# Patient Record
Sex: FEMALE | Race: WHITE | Employment: FULL TIME | URBAN - METROPOLITAN AREA
[De-identification: names, ages, dates, MRNs, and addresses within clinical notes are randomized per-mention and may not be internally consistent; named-entity substitution may affect disease eponyms.]

---
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
## 2023-01-19 ENCOUNTER — HOSPITAL ENCOUNTER (EMERGENCY)
Age: 18
Discharge: HOME | End: 2023-01-19
Attending: EMERGENCY MEDICINE
Payer: MEDICAID

## 2023-01-19 VITALS
DIASTOLIC BLOOD PRESSURE: 72 MMHG | RESPIRATION RATE: 16 BRPM | TEMPERATURE: 99.6 F | HEART RATE: 92 BPM | WEIGHT: 163.36 LBS | BODY MASS INDEX: 24.84 KG/M2 | SYSTOLIC BLOOD PRESSURE: 115 MMHG | OXYGEN SATURATION: 99 %

## 2023-01-19 DIAGNOSIS — J02.9 PHARYNGITIS, UNSPECIFIED ETIOLOGY: Primary | ICD-10-CM

## 2023-01-19 LAB — S PYO AG THROAT QL: NEGATIVE

## 2023-01-19 PROCEDURE — 87070 CULTURE OTHR SPECIMN AEROBIC: CPT

## 2023-01-19 PROCEDURE — 99283 EMERGENCY DEPT VISIT LOW MDM: CPT

## 2023-01-19 PROCEDURE — 74011250637 HC RX REV CODE- 250/637: Performed by: EMERGENCY MEDICINE

## 2023-01-19 PROCEDURE — 87880 STREP A ASSAY W/OPTIC: CPT

## 2023-01-19 RX ORDER — IBUPROFEN 600 MG/1
600 TABLET ORAL
Status: COMPLETED | OUTPATIENT
Start: 2023-01-19 | End: 2023-01-19

## 2023-01-19 RX ORDER — IBUPROFEN 600 MG/1
600 TABLET ORAL
Qty: 20 TABLET | Refills: 0 | Status: SHIPPED | OUTPATIENT
Start: 2023-01-19

## 2023-01-19 RX ADMIN — IBUPROFEN 600 MG: 600 TABLET, FILM COATED ORAL at 08:14

## 2023-01-19 NOTE — Clinical Note
[address]  [address] [facility] DEPT  [address] [facility] [address]  [phone]    Work/School Note    Date: 1/19/2023    To Whom It May concern:    Ayse Gamboa was seen and treated today in the emergency room by the following provider(s):  Attending Provider: Silva Gonzalez MD.      Ayse Gamboa is excused from work/school on 01/19/23 and 01/20/23. She is medically clear to return to work/school on 1/21/2023.        Sincerely,          Brock Cervantes MD

## 2023-01-19 NOTE — ED TRIAGE NOTES
Triage Note: Pt with sore throat that began last night. Pt also reports body aches and chills, but unsure if she has fevers. No medications taken PTA.

## 2023-01-19 NOTE — ED PROVIDER NOTES
Patient is a 20-year-old who presents with 2 days of a runny nose and sore throat. Patient states she feels like her head is a little bit stopped up. No cough. Patient has a birth control implant but does not take any daily medications by mouth. No cough. No vomiting or diarrhea. No fever. 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 Patient states she has had tonsillitis in the past and that is her concern. Patient does not feel like she has symptoms consistent with COVID at this time. Patient tolerating p.o. well. History reviewed. No pertinent past medical history. Past Surgical History:   Procedure Laterality Date    HX WISDOM TEETH EXTRACTION           History reviewed. No pertinent family history. Social History     Socioeconomic History    Marital status: SINGLE     Spouse name: Not on file    Number of children: Not on file    Years of education: Not on file    Highest education level: Not on file   Occupational History    Not on file   Tobacco Use    Smoking status: Never     Passive exposure: Never    Smokeless tobacco: Never   Substance and Sexual Activity    Alcohol use: Yes     Comment: occasionally    Drug use: Not Currently     Types: Marijuana    Sexual activity: Not on file   Other Topics Concern    Not on file   Social History Narrative    Not on file     Social Determinants of Health     Financial Resource Strain: Low Risk     Difficulty of Paying Living Expenses: Not hard at all   Food Insecurity: No Food Insecurity    Worried About Running Out of Food in the Last Year: Never true    Ran Out of Food in the Last Year: Never true   Transportation Needs: Not on file   Physical Activity: Not on file   Stress: Not on file   Social Connections: Not on file   Intimate Partner Violence: Not on file   Housing Stability: Not on file         ALLERGIES: Patient has no known allergies. Review of Systems   Constitutional:  Negative for fever.    HENT:  Positive for rhinorrhea and sore throat. Negative for congestion and ear pain. Eyes:  Negative for discharge. Respiratory:  Negative for cough and shortness of breath. Cardiovascular:  Negative for chest pain. Gastrointestinal:  Negative for abdominal pain, constipation, diarrhea, nausea and vomiting. Genitourinary:  Negative for dysuria. Musculoskeletal:  Negative for arthralgias and myalgias. Skin:  Negative for rash. Neurological:  Negative for weakness. Vitals:    01/19/23 0803   BP: 115/72   Pulse: (!) 105   Resp: 16   Temp: 99.6 °F (37.6 °C)   SpO2: 99%   Weight: 74.1 kg (163 lb 5.8 oz)            Physical Exam  Vitals and nursing note reviewed. Constitutional:       General: She is not in acute distress. Appearance: She is well-developed. She is not ill-appearing. HENT:      Head: Normocephalic and atraumatic. Right Ear: Tympanic membrane, ear canal and external ear normal.      Left Ear: Tympanic membrane, ear canal and external ear normal.      Nose: Nose normal. No congestion or rhinorrhea. Mouth/Throat:      Mouth: Mucous membranes are moist.      Pharynx: Posterior oropharyngeal erythema present. No oropharyngeal exudate. Eyes:      General:         Right eye: No discharge. Left eye: No discharge. Conjunctiva/sclera: Conjunctivae normal.   Cardiovascular:      Rate and Rhythm: Normal rate and regular rhythm. Pulmonary:      Effort: Pulmonary effort is normal. No respiratory distress. Breath sounds: Normal breath sounds. Abdominal:      General: There is no distension. Palpations: Abdomen is soft. Tenderness: There is no abdominal tenderness. There is no guarding or rebound. Musculoskeletal:         General: Normal range of motion. Cervical back: Normal range of motion and neck supple. Lymphadenopathy:      Cervical: No cervical adenopathy. Skin:     General: Skin is warm and dry. Findings: No rash.    Neurological:      General: No focal deficit present. Mental Status: She is alert and oriented to person, place, and time. Mental status is at baseline. Motor: No weakness. Psychiatric:         Mood and Affect: Mood normal.         Behavior: Behavior normal.        Medical Decision Making  21year-old with sore throat and rhinorrhea for the past 2 days. No fever or nausea or vomiting or diarrhea. On exam patient is in no respiratory distress and is not having any cough. Pharynx has some erythema but no exudate. Strep pharyngitis is in the differential and will obtain strep testing. Offered COVID testing but patient deferred at this time. No respiratory distress that would suggest pneumonia necessitating x-ray. Patient is tolerating p.o. well with no evidence to suggest dehydration that would necessitate IV fluids. Likely viral illness in nature. No evidence of an abscess on exam.    Amount and/or Complexity of Data Reviewed  Labs: ordered. Risk  Prescription drug management. Procedures      Recent Results (from the past 24 hour(s))   POC GROUP A STREP    Collection Time: 01/19/23  8:56 AM   Result Value Ref Range    Group A strep (POC) Negative NEG         No results found. 9:35 AM  Child has been re-examined and appears well. Child is active, interactive and appears well hydrated. Laboratory tests, medications, x-rays, diagnosis, follow up plan and return instructions have been reviewed and discussed with the family. Family has had the opportunity to ask questions about their child's care. Family expresses understanding and agreement with care plan, follow up and return instructions. Family agrees to return the child to the ER in 48 hours if their symptoms are not improving or immediately if they have any change in their condition. Family understands to follow up with their pediatrician as instructed to ensure resolution of the issue seen for today.   Please note that this dictation was completed with Dragon, computer voice recognition software. Quite often unanticipated grammatical, syntax, homophones, and other interpretive errors are inadvertently transcribed by the computer software. Please disregard these errors. Additionally, please excuse any errors that have escaped final proofreading.

## 2023-01-19 NOTE — ED NOTES
Pt discharged home with parent/guardian. Pt acting age appropriately, respirations regular and unlabored, cap refill less than two seconds. Skin pink, dry and warm. Lungs clear bilaterally. No further complaints at this time. Parent/guardian verbalized understanding of discharge paperwork and has no further questions at this time. Education provided about continuation of care, follow up care and medication administration of ibuprofen. Parent/guardian able to provided teach back about discharge instructions.

## 2023-01-21 LAB
BACTERIA SPEC CULT: NORMAL
SERVICE CMNT-IMP: NORMAL

## 2023-04-11 ENCOUNTER — TELEPHONE (OUTPATIENT)
Dept: INTERNAL MEDICINE CLINIC | Age: 18
End: 2023-04-11

## 2023-07-03 ENCOUNTER — HOSPITAL ENCOUNTER (EMERGENCY)
Facility: HOSPITAL | Age: 18
Discharge: HOME | End: 2023-07-03
Attending: PEDIATRICS
Payer: MEDICAID

## 2023-07-03 VITALS
RESPIRATION RATE: 20 BRPM | SYSTOLIC BLOOD PRESSURE: 112 MMHG | WEIGHT: 173.5 LBS | TEMPERATURE: 98.7 F | OXYGEN SATURATION: 99 % | HEART RATE: 82 BPM | DIASTOLIC BLOOD PRESSURE: 76 MMHG | BODY MASS INDEX: 26.38 KG/M2
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
## 2023-07-03 DIAGNOSIS — L03.116 CELLULITIS OF LEFT LOWER EXTREMITY: ICD-10-CM

## 2023-07-03 DIAGNOSIS — R60.0 LOCALIZED EDEMA: ICD-10-CM

## 2023-07-03 PROCEDURE — 6370000000 HC RX 637 (ALT 250 FOR IP): Performed by: PEDIATRICS

## 2023-07-03 PROCEDURE — 99283 EMERGENCY DEPT VISIT LOW MDM: CPT

## 2023-07-03 RX ORDER — DIPHENHYDRAMINE HCL 25 MG
25 CAPSULE ORAL ONCE
Status: COMPLETED | OUTPATIENT
Start: 2023-07-03 | End: 2023-07-03

## 2023-07-03 RX ORDER — IBUPROFEN 600 MG/1
600 TABLET ORAL EVERY 6 HOURS PRN
Qty: 20 TABLET | Refills: 0 | Status: SHIPPED | OUTPATIENT
Start: 2023-07-03

## 2023-07-03 RX ORDER — SULFAMETHOXAZOLE AND TRIMETHOPRIM 800; 160 MG/1; MG/1
1 TABLET ORAL 2 TIMES DAILY
Qty: 18 TABLET | Refills: 0 | Status: SHIPPED | OUTPATIENT
Start: 2023-07-03 | End: 2023-07-12

## 2023-07-03 RX ORDER — DIPHENHYDRAMINE HCL 25 MG
25 CAPSULE ORAL EVERY 6 HOURS PRN
Qty: 20 CAPSULE | Refills: 0 | Status: SHIPPED | OUTPATIENT
Start: 2023-07-03

## 2023-07-03 RX ORDER — SULFAMETHOXAZOLE AND TRIMETHOPRIM 800; 160 MG/1; MG/1
1 TABLET ORAL ONCE
Status: COMPLETED | OUTPATIENT
Start: 2023-07-03 | End: 2023-07-03

## 2023-07-03 RX ORDER — PREDNISONE 20 MG/1
40 TABLET ORAL ONCE
Status: COMPLETED | OUTPATIENT
Start: 2023-07-03 | End: 2023-07-03

## 2023-07-03 RX ORDER — PREDNISONE 50 MG/1
50 TABLET ORAL DAILY
Qty: 4 TABLET | Refills: 0 | Status: SHIPPED | OUTPATIENT
Start: 2023-07-03 | End: 2023-07-07

## 2023-07-03 RX ORDER — IBUPROFEN 600 MG/1
600 TABLET ORAL ONCE
Status: COMPLETED | OUTPATIENT
Start: 2023-07-03 | End: 2023-07-03

## 2023-07-03 RX ADMIN — IBUPROFEN 600 MG: 600 TABLET, FILM COATED ORAL at 05:09

## 2023-07-03 RX ADMIN — SULFAMETHOXAZOLE AND TRIMETHOPRIM 1 TABLET: 800; 160 TABLET ORAL at 05:09

## 2023-07-03 RX ADMIN — PREDNISONE 40 MG: 20 TABLET ORAL at 05:09

## 2023-07-03 RX ADMIN — DIPHENHYDRAMINE HYDROCHLORIDE 25 MG: 25 CAPSULE ORAL at 05:09

## 2023-07-03 ASSESSMENT — PAIN SCALES - GENERAL: PAINLEVEL_OUTOF10: 0

## 2023-07-03 NOTE — ED PROVIDER NOTES
MG TABLET    Take 1 tablet by mouth daily for 4 days    SULFAMETHOXAZOLE-TRIMETHOPRIM (BACTRIM DS) 800-160 MG PER TABLET    Take 1 tablet by mouth 2 times daily for 9 days         (Please note that portions of this note were completed with a voice recognition program.  Efforts were made to edit the dictations but occasionally words are mis-transcribed.)    Julian Olmstead MD (electronically signed)  Emergency Attending Physician / Physician Assistant / Nurse Practitioner              Eufemia Avila MD  07/03/23 [id number]

## 2023-07-03 NOTE — ED TRIAGE NOTES
Triage Note: Pt. States on Saturday she was outside and got bit by an insect. Pt. C/o pain and itching to bite on left ankle and two bites to back of right upper leg. No Meds PTA.

## 2023-07-03 NOTE — ED NOTES
Pt discharged home with parent/guardian. Pt acting age appropriately, respirations regular and unlabored, cap refill less than two seconds. Skin pink, dry and warm. Lungs clear bilaterally. No further complaints at this time. Parent/guardian verbalized understanding of discharge paperwork and has no further questions at this time. Education provided about continuation of care, follow up care with PCP, return for worsening symptoms and medication administration: prescription instructions provided for Motrin, Tylenol, Benadryl, and Bactrim. Parent/guardian able to provided teach back about discharge instructions.        Aakash Sierra RN  07/03/23 [id number]

## 2023-09-18 ENCOUNTER — OFFICE VISIT (OUTPATIENT)
Facility: CLINIC | Age: 18
End: 2023-09-18
Payer: MEDICAID

## 2023-09-18 VITALS
TEMPERATURE: 98.8 F | WEIGHT: 176.2 LBS | DIASTOLIC BLOOD PRESSURE: 64 MMHG | OXYGEN SATURATION: 98 % | HEIGHT: 68 IN | SYSTOLIC BLOOD PRESSURE: 102 MMHG | BODY MASS INDEX: 26.7 KG/M2 | HEART RATE: 77 BPM

## 2023-09-18 DIAGNOSIS — Z00.00 ENCOUNTER FOR WELL ADULT EXAM WITHOUT ABNORMAL FINDINGS: Primary | ICD-10-CM

## 2023-09-18 DIAGNOSIS — K59.00 CONSTIPATION, UNSPECIFIED CONSTIPATION TYPE: ICD-10-CM

## 2023-09-18 PROCEDURE — 99395 PREV VISIT EST AGE 18-39: CPT | Performed by: INTERNAL MEDICINE

## 2023-09-18 RX ORDER — SENNA AND DOCUSATE SODIUM 50; 8.6 MG/1; MG/1
2 TABLET, FILM COATED ORAL DAILY
Qty: 180 TABLET | Refills: 1 | Status: SHIPPED | OUTPATIENT
Start: 2023-09-18 | End: 2024-03-16

## 2023-09-18 ASSESSMENT — ENCOUNTER SYMPTOMS
CONSTIPATION: 1
RESPIRATORY NEGATIVE: 1

## 2023-09-18 ASSESSMENT — PATIENT HEALTH QUESTIONNAIRE - PHQ9
1. LITTLE INTEREST OR PLEASURE IN DOING THINGS: 0
SUM OF ALL RESPONSES TO PHQ QUESTIONS 1-9: 0

## 2023-10-26 ENCOUNTER — TELEPHONE (OUTPATIENT)
Facility: CLINIC | Age: 18
End: 2023-10-26

## 2023-10-26 NOTE — TELEPHONE ENCOUNTER
----- Message from Grayson Arreola sent at 10/25/2023  2:55 PM EDT -----  Subject: Referral Request    Reason for referral request? Lorraine Garcia for pregnancy   Provider patient wants to be referred to(if known):     Provider Phone Number(if known): Additional Information for Provider? patient would like to have a blood   test done for pregnancy.  Please call to discuss   ---------------------------------------------------------------------------  --------------  Vamsi [place]    [phone]; OK to leave message on voicemail  ---------------------------------------------------------------------------  --------------

## 2023-11-02 NOTE — TELEPHONE ENCOUNTER
Returned pt call. N/A. Left message. Pt says ok to leave message with instructions. Left pt message to please call her GYN office. That Dr. Eli Chinchilla was under impression she was going to her GYN to discuss conception planning. Please call GYN regarding pregnancy blood work.  Thank you

## 2024-01-08 ENCOUNTER — TELEPHONE (OUTPATIENT)
Facility: CLINIC | Age: 19
End: 2024-01-08

## 2024-01-08 NOTE — TELEPHONE ENCOUNTER
Spoke to patient.   There is no immunizations in her chart and there is nothing listed in the [place] Immunization Information System.  I tried multiple times to look her up and system is stating patient not found.

## 2024-01-08 NOTE — TELEPHONE ENCOUNTER
----- Message from Etta Baca sent at 1/8/2024  9:50 AM EST -----  Subject: Message to Provider    QUESTIONS  Information for Provider? Dorene needs a list of Vaccinations that she has   got done. she needs it printed out this week for a job. please call dorene   if needed.   ---------------------------------------------------------------------------  --------------  CALL BACK INFO  [phone]; OK to leave message on voicemail  ---------------------------------------------------------------------------  --------------  SCRIPT ANSWERS  Relationship to Patient? Self

## 2024-02-05 ENCOUNTER — TELEPHONE (OUTPATIENT)
Facility: CLINIC | Age: 19
End: 2024-02-05

## 2024-02-05 NOTE — TELEPHONE ENCOUNTER
----- Message from Solange Romo sent at 2/5/2024  3:16 PM EST -----  Subject: Message to Provider    QUESTIONS  Information for Provider? Patient is reaching out wanting to be put on a   wait list incase a sooner appointment comes up with Christina Talavera for   an establish care/physical appointment. Patient is currently schedule for   5.29.2024 and was a former patient of Katia Vasquez.   Please reach out to the patient to discuss.   ---------------------------------------------------------------------------  --------------  CALL BACK INFO  [phone]; OK to leave message on voicemail  ---------------------------------------------------------------------------  --------------  SCRIPT ANSWERS  Relationship to Patient? Self

## 2024-02-08 ENCOUNTER — TELEPHONE (OUTPATIENT)
Facility: CLINIC | Age: 19
End: 2024-02-08

## 2024-02-08 NOTE — TELEPHONE ENCOUNTER
----- Message from Natalieesperanza Castañeda sent at 2/2/2024 12:30 PM EST -----  Subject: Appointment Request    Reason for Call: New Patient/New to Provider Appointment needed: New   Patient Request Appointment    QUESTIONS    Reason for appointment request? No appointments available during search     Additional Information for Provider? Dorene Quick received a call from the   [facility] to reschedule with CARMELITA Hernandez nothing available during   search please call to reschedule appointment for a new patient.  ---------------------------------------------------------------------------  --------------  CALL BACK INFO  [phone]; OK to leave message on voicemail  ---------------------------------------------------------------------------  --------------  SCRIPT ANSWERS

## 2024-11-22 ENCOUNTER — OFFICE VISIT (OUTPATIENT)
Facility: CLINIC | Age: 19
End: 2024-11-22
Payer: MEDICAID

## 2024-11-22 VITALS
RESPIRATION RATE: 16 BRPM | HEART RATE: 72 BPM | BODY MASS INDEX: 25.61 KG/M2 | DIASTOLIC BLOOD PRESSURE: 67 MMHG | WEIGHT: 169 LBS | HEIGHT: 68 IN | SYSTOLIC BLOOD PRESSURE: 121 MMHG | TEMPERATURE: 98.9 F | OXYGEN SATURATION: 99 %

## 2024-11-22 DIAGNOSIS — Z11.3 ROUTINE SCREENING FOR STI (SEXUALLY TRANSMITTED INFECTION): ICD-10-CM

## 2024-11-22 DIAGNOSIS — Z11.4 SCREENING FOR HIV WITHOUT PRESENCE OF RISK FACTORS: ICD-10-CM

## 2024-11-22 DIAGNOSIS — Z3A.08 8 WEEKS GESTATION OF PREGNANCY: ICD-10-CM

## 2024-11-22 DIAGNOSIS — N91.2 AMENORRHEA: Primary | ICD-10-CM

## 2024-11-22 DIAGNOSIS — R89.9 ABNORMAL LABORATORY TEST: ICD-10-CM

## 2024-11-22 DIAGNOSIS — N91.2 AMENORRHEA: ICD-10-CM

## 2024-11-22 LAB
HCG QUALITATIVE, POC: POSITIVE
HCG, PREGNANCY, URINE, POC: POSITIVE
VALID INTERNAL CONTROL, POC: YES

## 2024-11-22 PROCEDURE — 99203 OFFICE O/P NEW LOW 30 MIN: CPT | Performed by: NURSE PRACTITIONER

## 2024-11-22 PROCEDURE — 84703 CHORIONIC GONADOTROPIN ASSAY: CPT | Performed by: NURSE PRACTITIONER

## 2024-11-22 SDOH — ECONOMIC STABILITY: FOOD INSECURITY: WITHIN THE PAST 12 MONTHS, THE FOOD YOU BOUGHT JUST DIDN'T LAST AND YOU DIDN'T HAVE MONEY TO GET MORE.: NEVER TRUE

## 2024-11-22 SDOH — ECONOMIC STABILITY: INCOME INSECURITY: HOW HARD IS IT FOR YOU TO PAY FOR THE VERY BASICS LIKE FOOD, HOUSING, MEDICAL CARE, AND HEATING?: NOT HARD AT ALL

## 2024-11-22 SDOH — ECONOMIC STABILITY: FOOD INSECURITY: WITHIN THE PAST 12 MONTHS, YOU WORRIED THAT YOUR FOOD WOULD RUN OUT BEFORE YOU GOT MONEY TO BUY MORE.: NEVER TRUE

## 2024-11-22 ASSESSMENT — PATIENT HEALTH QUESTIONNAIRE - PHQ9
SUM OF ALL RESPONSES TO PHQ QUESTIONS 1-9: 0
SUM OF ALL RESPONSES TO PHQ QUESTIONS 1-9: 0
1. LITTLE INTEREST OR PLEASURE IN DOING THINGS: NOT AT ALL
2. FEELING DOWN, DEPRESSED OR HOPELESS: NOT AT ALL
SUM OF ALL RESPONSES TO PHQ9 QUESTIONS 1 & 2: 0
SUM OF ALL RESPONSES TO PHQ QUESTIONS 1-9: 0
SUM OF ALL RESPONSES TO PHQ QUESTIONS 1-9: 0

## 2024-11-22 NOTE — PROGRESS NOTES
Chief Complaint   Patient presents with    Establish Care    Annual Exam    Amenorrhea         /67 (Site: Left Upper Arm, Position: Sitting)   Pulse 72   Temp 98.9 °F (37.2 °C) (Oral)   Resp 16   Ht 1.727 m (5' 8\")   Wt 76.7 kg (169 lb)   SpO2 99%   BMI 25.70 kg/m²       \"Have you been to the ER, urgent care clinic since your last visit?  Hospitalized since your last visit?\"    NO    “Have you seen or consulted any other health care providers outside our system since your last visit?”    NO     “Have you had a pap smear?”    YES - Where: [facility] ([facility]) 02/2024 (Avani Carrizales) Nurse/CMA to request most recent records if not in the chart    No cervical cancer screening on file

## 2024-11-22 NOTE — PROGRESS NOTES
Subjective  Chief Complaint   Patient presents with    Establish Care    Annual Exam    Amenorrhea     HPI:  Dorene Quick is a 19 y.o. female presents with limited past medical history.  Patient presents with suspicion of possible pregnancy.  Urine pregnancy test positive, patient reports removal of controlling in July 2024.  Referring patient to OB to establish care and relationship.  Patient currently denies nausea vomiting, headaches, dizziness, syncopal episodes, or chest pain      Review of Systems   Constitutional:  Negative for chills, fatigue and fever.   HENT:  Negative for ear pain, hearing loss, nosebleeds, rhinorrhea, sore throat and trouble swallowing.    Eyes:  Negative for pain, discharge and redness.   Respiratory:  Negative for cough, chest tightness, shortness of breath and wheezing.    Gastrointestinal:  Negative for abdominal pain, nausea and vomiting.   Endocrine: Negative for cold intolerance and heat intolerance.   Genitourinary:  Negative for dysuria, flank pain, genital sores, menstrual problem, urgency, vaginal bleeding, vaginal discharge and vaginal pain.   Skin:  Negative for color change and rash.   Neurological:  Negative for dizziness, seizures, weakness, light-headedness and headaches.   Psychiatric/Behavioral:  Negative for agitation and confusion.         History reviewed. No pertinent past medical history.    Current Outpatient Medications on File Prior to Visit   Medication Sig Dispense Refill    Etonogestrel (NEXPLANON SC) Inject into the skin (Patient not taking: Reported on 11/22/2024)       No current facility-administered medications on file prior to visit.       No Known Allergies    Objective  Vitals:    11/22/24 0923   BP: 121/67   Pulse: 72   Resp: 16   Temp: 98.9 °F (37.2 °C)   SpO2: 99%       Physical Exam  Constitutional:       General: She is not in acute distress.     Appearance: Normal appearance. She is normal weight. She is not ill-appearing.   HENT:

## 2024-11-27 LAB
ALBUMIN SERPL-MCNC: 4.2 G/DL (ref 4–5)
ALP SERPL-CCNC: 42 IU/L (ref 44–121)
ALT SERPL-CCNC: 13 IU/L (ref 0–32)
AST SERPL-CCNC: 19 IU/L (ref 0–40)
BASOPHILS # BLD AUTO: 0.1 X10E3/UL (ref 0–0.2)
BASOPHILS NFR BLD AUTO: 1 %
BILIRUB SERPL-MCNC: 0.2 MG/DL (ref 0–1.2)
BUN SERPL-MCNC: 17 MG/DL (ref 6–20)
BUN/CREAT SERPL: 22 (ref 9–23)
CALCIUM SERPL-MCNC: 9 MG/DL (ref 8.7–10.2)
CHLORIDE SERPL-SCNC: 104 MMOL/L (ref 96–106)
CHOLEST SERPL-MCNC: 138 MG/DL (ref 100–199)
CO2 SERPL-SCNC: 19 MMOL/L (ref 20–29)
CREAT SERPL-MCNC: 0.78 MG/DL (ref 0.57–1)
EGFRCR SERPLBLD CKD-EPI 2021: 111 ML/MIN/1.73
EOSINOPHIL # BLD AUTO: 0.1 X10E3/UL (ref 0–0.4)
EOSINOPHIL NFR BLD AUTO: 1 %
ERYTHROCYTE [DISTWIDTH] IN BLOOD BY AUTOMATED COUNT: 12.1 % (ref 11.7–15.4)
GLOBULIN SER CALC-MCNC: 2.6 G/DL (ref 1.5–4.5)
GLUCOSE SERPL-MCNC: 81 MG/DL (ref 70–99)
HCG INTACT+B SERPL-ACNC: NORMAL MIU/ML
HCT VFR BLD AUTO: 35.8 % (ref 34–46.6)
HDLC SERPL-MCNC: 56 MG/DL
HGB BLD-MCNC: 11.8 G/DL (ref 11.1–15.9)
HIV 1+2 AB+HIV1 P24 AG SERPL QL IA: NON REACTIVE
IMM GRANULOCYTES # BLD AUTO: 0 X10E3/UL (ref 0–0.1)
IMM GRANULOCYTES NFR BLD AUTO: 0 %
LDLC SERPL CALC-MCNC: 70 MG/DL (ref 0–99)
LYMPHOCYTES # BLD AUTO: 2.1 X10E3/UL (ref 0.7–3.1)
LYMPHOCYTES NFR BLD AUTO: 27 %
MCH RBC QN AUTO: 30.8 PG (ref 26.6–33)
MCHC RBC AUTO-ENTMCNC: 33 G/DL (ref 31.5–35.7)
MCV RBC AUTO: 94 FL (ref 79–97)
MONOCYTES # BLD AUTO: 0.6 X10E3/UL (ref 0.1–0.9)
MONOCYTES NFR BLD AUTO: 7 %
NEUTROPHILS # BLD AUTO: 4.8 X10E3/UL (ref 1.4–7)
NEUTROPHILS NFR BLD AUTO: 64 %
PLATELET # BLD AUTO: 234 X10E3/UL (ref 150–450)
POTASSIUM SERPL-SCNC: 3.9 MMOL/L (ref 3.5–5.2)
PROT SERPL-MCNC: 6.8 G/DL (ref 6–8.5)
RBC # BLD AUTO: 3.83 X10E6/UL (ref 3.77–5.28)
SODIUM SERPL-SCNC: 139 MMOL/L (ref 134–144)
TRIGL SERPL-MCNC: 54 MG/DL (ref 0–149)
VLDLC SERPL CALC-MCNC: 12 MG/DL (ref 5–40)
WBC # BLD AUTO: 7.7 X10E3/UL (ref 3.4–10.8)

## 2024-11-28 LAB
C TRACH RRNA SPEC QL NAA+PROBE: NEGATIVE
N GONORRHOEA RRNA SPEC QL NAA+PROBE: NEGATIVE

## 2024-11-29 ENCOUNTER — PATIENT MESSAGE (OUTPATIENT)
Facility: CLINIC | Age: 19
End: 2024-11-29

## 2024-12-12 ENCOUNTER — TELEPHONE (OUTPATIENT)
Facility: CLINIC | Age: 19
End: 2024-12-12

## 2024-12-12 NOTE — TELEPHONE ENCOUNTER
----- Message from Anastacia GAY sent at 12/11/2024  1:36 PM EST -----  Regarding: ECC Appointment Request  ECC Appointment Request    Patient needs appointment for ECC Appointment Type: Existing Condition Follow Up.    Patient Requested Dates(s): Before December 30, 2024  Patient Requested Time: Anytime   Provider Name:   Baltazar Dougherty APRN - NP    Reason for Appointment Request: Other - Reschedule     Additional Information: Patient wants to reschedule her appointment on December 30, 2024 because she is moving and she just want to make sure that her insurance are all settled. She wants an earlier appointment  --------------------------------------------------------------------------------------------------------------------------    Relationship to Patient: Self     Call Back Information: OK to leave message on voicemail  Preferred Call Back Number: Phone - [phone]

## 2024-12-19 NOTE — TELEPHONE ENCOUNTER
Called patient to advise her that we do not have any sooner appointments.  Patient stated that she will keep the appointment she has on December 30, 2024 at 3:30 pm.

## 2024-12-30 ENCOUNTER — OFFICE VISIT (OUTPATIENT)
Facility: CLINIC | Age: 19
End: 2024-12-30
Payer: MEDICAID

## 2024-12-30 VITALS
RESPIRATION RATE: 18 BRPM | BODY MASS INDEX: 28.08 KG/M2 | TEMPERATURE: 98.3 F | HEIGHT: 67 IN | OXYGEN SATURATION: 99 % | HEART RATE: 78 BPM | WEIGHT: 178.9 LBS | SYSTOLIC BLOOD PRESSURE: 107 MMHG | DIASTOLIC BLOOD PRESSURE: 53 MMHG

## 2024-12-30 DIAGNOSIS — K21.9 GASTROESOPHAGEAL REFLUX DISEASE WITHOUT ESOPHAGITIS: Primary | ICD-10-CM

## 2024-12-30 PROCEDURE — 99213 OFFICE O/P EST LOW 20 MIN: CPT | Performed by: NURSE PRACTITIONER

## 2024-12-30 RX ORDER — ESOMEPRAZOLE MAGNESIUM 40 MG/1
40 CAPSULE, DELAYED RELEASE ORAL
Qty: 90 CAPSULE | Refills: 1 | Status: SHIPPED | OUTPATIENT
Start: 2024-12-30

## 2024-12-30 ASSESSMENT — ENCOUNTER SYMPTOMS
SHORTNESS OF BREATH: 0
VOMITING: 0
EYE DISCHARGE: 0
NAUSEA: 0
WHEEZING: 0
RHINORRHEA: 0
EYE PAIN: 0
CHEST TIGHTNESS: 0
SORE THROAT: 0
TROUBLE SWALLOWING: 0
EYE REDNESS: 0
COLOR CHANGE: 0
ABDOMINAL PAIN: 0
COUGH: 0

## 2024-12-30 NOTE — PROGRESS NOTES
\"Have you been to the ER, urgent care clinic since your last visit?  Hospitalized since your last visit?\"    NO    “Have you seen or consulted any other health care providers outside of [facility] since your last visit?”    YES - When: approximately 12/22/2024 ago.  Where and Why: OBGYN at [facility] for a follow up on pregnancy.     “Have you had a pap smear?”    YES - Where: [facility] Nurse/CMA to request most recent records if not in the chart    No cervical cancer screening on file     Chief Complaint   Patient presents with    Follow-up Chronic Condition    pregnancy     BP (!) 107/53 (Site: Left Upper Arm, Position: Sitting, Cuff Size: Medium Adult)   Pulse 78   Temp 98.3 °F (36.8 °C) (Temporal)   Resp 18   Ht 1.702 m (5' 7\")   Wt 81.1 kg (178 lb 14.4 oz)   SpO2 99%   BMI 28.02 kg/m²           Click Here for Release of Records Request

## 2024-12-30 NOTE — TELEPHONE ENCOUNTER
Per Dr. Dougherty    There is no contradiction to taking both. Thank you, Dr. JUAN R Dougherty -NP

## 2024-12-31 NOTE — PROGRESS NOTES
Subjective  Chief Complaint   Patient presents with    Follow-up Chronic Condition    pregnancy     HPI:  Dorene Quick is a 19 y.o. female patient presents for follow-up status post confirmation of pregnancy in first trimester.  Patient reports increased and worsening heartburn/gastroesophageal reflux disease symptoms.  Patient denies nausea vomiting chest pain, or neurological symptoms.  Prescribing Nexium for patient for symptom management.    Patient is being followed by [facility] woman, reports completing first transvaginal ultrasound without acute concerns.       Review of Systems   Constitutional:  Negative for chills, fatigue and fever.   HENT:  Negative for ear pain, hearing loss, nosebleeds, rhinorrhea, sore throat and trouble swallowing.    Eyes:  Negative for pain, discharge and redness.   Respiratory:  Negative for cough, chest tightness, shortness of breath and wheezing.    Gastrointestinal:  Negative for abdominal pain, nausea and vomiting.   Endocrine: Negative for cold intolerance and heat intolerance.   Genitourinary:  Negative for dysuria, flank pain, genital sores, menstrual problem, urgency, vaginal bleeding, vaginal discharge and vaginal pain.   Skin:  Negative for color change and rash.   Neurological:  Negative for dizziness, seizures, weakness, light-headedness and headaches.   Psychiatric/Behavioral:  Negative for agitation and confusion.         History reviewed. No pertinent past medical history.    No current outpatient medications on file prior to visit.     No current facility-administered medications on file prior to visit.       No Known Allergies    Objective  Vitals:    12/30/24 1536   BP: (!) 107/53   Pulse: 78   Resp: 18   Temp: 98.3 °F (36.8 °C)   SpO2: 99%       Physical Exam  Constitutional:       General: She is not in acute distress.     Appearance: Normal appearance. She is normal weight. She is not ill-appearing.   HENT:      Head: Normocephalic and

## 2025-01-08 ENCOUNTER — HOSPITAL ENCOUNTER (EMERGENCY)
Facility: HOSPITAL | Age: 20
Discharge: HOME | End: 2025-01-08

## 2025-01-08 VITALS
WEIGHT: 180 LBS | SYSTOLIC BLOOD PRESSURE: 100 MMHG | RESPIRATION RATE: 18 BRPM | DIASTOLIC BLOOD PRESSURE: 60 MMHG | BODY MASS INDEX: 28.25 KG/M2 | HEART RATE: 118 BPM | TEMPERATURE: 100.3 F | OXYGEN SATURATION: 98 % | HEIGHT: 67 IN

## 2025-01-08 DIAGNOSIS — U07.1 COVID-19: Primary | ICD-10-CM

## 2025-01-08 LAB
APPEARANCE UR: CLEAR
BACTERIA URNS QL MICRO: NEGATIVE /HPF
BILIRUB UR QL: NEGATIVE
COLOR UR: YELLOW
EPITH CASTS URNS QL MICRO: ABNORMAL /LPF
FLUAV RNA SPEC QL NAA+PROBE: NOT DETECTED
FLUBV RNA SPEC QL NAA+PROBE: NOT DETECTED
GLUCOSE UR STRIP.AUTO-MCNC: NEGATIVE MG/DL
HGB UR QL STRIP: NEGATIVE
KETONES UR QL STRIP.AUTO: NEGATIVE MG/DL
LEUKOCYTE ESTERASE UR QL STRIP.AUTO: NEGATIVE
NITRITE UR QL STRIP.AUTO: NEGATIVE
PH UR STRIP: 6 (ref 5–8)
PROT UR STRIP-MCNC: NEGATIVE MG/DL
RBC #/AREA URNS HPF: ABNORMAL /HPF (ref 0–5)
S PYO DNA THROAT QL NAA+PROBE: NOT DETECTED
SARS-COV-2 RNA RESP QL NAA+PROBE: DETECTED
SP GR UR REFRACTOMETRY: 1.02 (ref 1–1.03)
URINE CULTURE IF INDICATED: ABNORMAL
UROBILINOGEN UR QL STRIP.AUTO: 0.1 EU/DL (ref 0.2–1)
WBC URNS QL MICRO: ABNORMAL /HPF (ref 0–4)

## 2025-01-08 PROCEDURE — 99283 EMERGENCY DEPT VISIT LOW MDM: CPT

## 2025-01-08 PROCEDURE — 87636 SARSCOV2 & INF A&B AMP PRB: CPT

## 2025-01-08 PROCEDURE — 81001 URINALYSIS AUTO W/SCOPE: CPT

## 2025-01-08 PROCEDURE — 6370000000 HC RX 637 (ALT 250 FOR IP): Performed by: NURSE PRACTITIONER

## 2025-01-08 PROCEDURE — 87651 STREP A DNA AMP PROBE: CPT

## 2025-01-08 RX ORDER — ACETAMINOPHEN 325 MG/1
650 TABLET ORAL
Status: COMPLETED | OUTPATIENT
Start: 2025-01-08 | End: 2025-01-08

## 2025-01-08 RX ORDER — BENZONATATE 100 MG/1
100 CAPSULE ORAL 3 TIMES DAILY PRN
Qty: 30 CAPSULE | Refills: 0 | Status: SHIPPED | OUTPATIENT
Start: 2025-01-08 | End: 2025-01-18

## 2025-01-08 RX ADMIN — ACETAMINOPHEN 650 MG: 325 TABLET ORAL at 20:01

## 2025-01-08 ASSESSMENT — PAIN - FUNCTIONAL ASSESSMENT: PAIN_FUNCTIONAL_ASSESSMENT: 0-10

## 2025-01-08 ASSESSMENT — PAIN SCALES - GENERAL: PAINLEVEL_OUTOF10: 7

## 2025-01-08 ASSESSMENT — LIFESTYLE VARIABLES
HOW OFTEN DO YOU HAVE A DRINK CONTAINING ALCOHOL: NEVER
HOW MANY STANDARD DRINKS CONTAINING ALCOHOL DO YOU HAVE ON A TYPICAL DAY: PATIENT DOES NOT DRINK

## 2025-01-09 NOTE — ED TRIAGE NOTES
Pt states has been feeling terrible for about 2-3 days.  has had fever all day and is 11 weeks pregnant.

## 2025-01-09 NOTE — ED PROVIDER NOTES
[facility] EMERGENCY DEPT  EMERGENCY DEPARTMENT HISTORY AND PHYSICAL EXAM      Date: 1/8/2025  Patient Name: Dorene Quick  MRN: [id number]  YOB: 2005  Date of evaluation: 1/8/2025  Provider: TOMI Urean NP   Note Started: 8:35 PM EST 1/8/25    HISTORY OF PRESENT ILLNESS     Chief Complaint   Patient presents with    Chills    Cough    Generalized Body Aches       History Provided By: Patient    HPI: Dorene Quick is a 19 y.o. female with past medical history as listed below presents to the ER with cold symptoms.  Patient has been feeling bad for 2 to 3 days.  Patient also 11 weeks pregnant.  Patient comes in for evaluation.    PAST MEDICAL HISTORY   Past Medical History:  History reviewed. No pertinent past medical history.    Past Surgical History:  Past Surgical History:   Procedure Laterality Date    WISDOM TOOTH EXTRACTION         Family History:  History reviewed. No pertinent family history.    Social History:  Social History     Tobacco Use    Smoking status: Never     Passive exposure: Never    Smokeless tobacco: Never   Substance Use Topics    Alcohol use: Not Currently    Drug use: Not Currently     Types: Marijuana (Weed)       Allergies:  No Known Allergies    PCP: Baltazar Dougherty APRN - NP    Current Meds:   No current facility-administered medications for this encounter.     Current Outpatient Medications   Medication Sig Dispense Refill    benzonatate (TESSALON) 100 MG capsule Take 1 capsule by mouth 3 times daily as needed for Cough 30 capsule 0    pseudoephedrine (SUDAFED SINUS CONGESTION 24HR) 240 MG TB24 extended release tablet Take 1 tablet by mouth daily 14 tablet 0    esomeprazole (NEXIUM) 40 MG delayed release capsule Take 1 capsule by mouth every morning (before breakfast) 90 capsule 1       Social Determinants of Health:   Social Determinants of Health     Tobacco Use: Low Risk  (1/8/2025)    Patient History     Smoking Tobacco Use: Never     Smokeless Tobacco Use: Never      Dispositions:[id number]}     PATIENT REFERRED TO:  No follow-up provider specified.      DISCHARGE MEDICATIONS:     Medication List        ASK your doctor about these medications      esomeprazole 40 MG delayed release capsule  Commonly known as: NEXIUM  Take 1 capsule by mouth every morning (before breakfast)                DISCONTINUED MEDICATIONS:  Current Discharge Medication List          I am the Primary Clinician of Record: TOMI Urena NP (electronically signed)    (Please note that parts of this dictation were completed with voice recognition software. Quite often unanticipated grammatical, syntax, homophones, and other interpretive errors are inadvertently transcribed by the computer software. Please disregards these errors. Please excuse any errors that have escaped final proofreading.)

## 2025-07-17 ENCOUNTER — APPOINTMENT (OUTPATIENT)
Facility: HOSPITAL | Age: 20
End: 2025-07-17
Payer: MEDICAID

## 2025-07-17 ENCOUNTER — HOSPITAL ENCOUNTER (OUTPATIENT)
Facility: HOSPITAL | Age: 20
Discharge: HOME | End: 2025-07-17
Attending: OBSTETRICS & GYNECOLOGY | Admitting: OBSTETRICS & GYNECOLOGY
Payer: MEDICAID

## 2025-07-17 VITALS
HEART RATE: 90 BPM | RESPIRATION RATE: 16 BRPM | SYSTOLIC BLOOD PRESSURE: 124 MMHG | BODY MASS INDEX: 37.04 KG/M2 | WEIGHT: 236 LBS | HEIGHT: 67 IN | TEMPERATURE: 98.2 F | OXYGEN SATURATION: 97 % | DIASTOLIC BLOOD PRESSURE: 69 MMHG

## 2025-07-17 PROBLEM — Z3A.38 38 WEEKS GESTATION OF PREGNANCY: Status: ACTIVE | Noted: 2025-07-17

## 2025-07-17 LAB
APPEARANCE UR: ABNORMAL
BACTERIA URNS QL MICRO: ABNORMAL /HPF
BILIRUB UR QL: NEGATIVE
COLOR UR: ABNORMAL
EPITH CASTS URNS QL MICRO: ABNORMAL /LPF
GLUCOSE UR STRIP.AUTO-MCNC: NEGATIVE MG/DL
HGB UR QL STRIP: NEGATIVE
KETONES UR QL STRIP.AUTO: 5 MG/DL
LEUKOCYTE ESTERASE UR QL STRIP.AUTO: ABNORMAL
MUCOUS THREADS URNS QL MICRO: ABNORMAL /LPF
NITRITE UR QL STRIP.AUTO: NEGATIVE
PH UR STRIP: 5 (ref 5–8)
PROT UR STRIP-MCNC: 100 MG/DL
RBC #/AREA URNS HPF: ABNORMAL /HPF (ref 0–5)
SP GR UR REFRACTOMETRY: 1.03 (ref 1–1.03)
URINE CULTURE IF INDICATED: ABNORMAL
UROBILINOGEN UR QL STRIP.AUTO: 0.1 EU/DL (ref 0.1–1)
WBC URNS QL MICRO: ABNORMAL /HPF (ref 0–4)

## 2025-07-17 PROCEDURE — 99203 OFFICE O/P NEW LOW 30 MIN: CPT

## 2025-07-17 PROCEDURE — 81001 URINALYSIS AUTO W/SCOPE: CPT

## 2025-07-17 PROCEDURE — 59025 FETAL NON-STRESS TEST: CPT

## 2025-07-17 PROCEDURE — 87086 URINE CULTURE/COLONY COUNT: CPT

## 2025-07-17 PROCEDURE — 4500000002 HC ER NO CHARGE

## 2025-07-17 NOTE — ED TRIAGE NOTES
Patient presents with decreased fetal movement and lower extremity swelling, decreased fetal movement noticed between 3pm-7pm     1st pregnancy, no complications, sees OB in [place], no pain no fluids/discharge

## 2025-07-18 ENCOUNTER — OFFICE VISIT (OUTPATIENT)
Facility: CLINIC | Age: 20
End: 2025-07-18

## 2025-07-18 VITALS
RESPIRATION RATE: 16 BRPM | HEIGHT: 67 IN | WEIGHT: 240 LBS | HEART RATE: 92 BPM | TEMPERATURE: 98.4 F | DIASTOLIC BLOOD PRESSURE: 76 MMHG | BODY MASS INDEX: 37.67 KG/M2 | SYSTOLIC BLOOD PRESSURE: 115 MMHG | OXYGEN SATURATION: 97 %

## 2025-07-18 DIAGNOSIS — E16.2 HYPOGLYCEMIA: Primary | ICD-10-CM

## 2025-07-18 SDOH — ECONOMIC STABILITY: FOOD INSECURITY: WITHIN THE PAST 12 MONTHS, THE FOOD YOU BOUGHT JUST DIDN'T LAST AND YOU DIDN'T HAVE MONEY TO GET MORE.: NEVER TRUE

## 2025-07-18 SDOH — ECONOMIC STABILITY: FOOD INSECURITY: WITHIN THE PAST 12 MONTHS, YOU WORRIED THAT YOUR FOOD WOULD RUN OUT BEFORE YOU GOT MONEY TO BUY MORE.: NEVER TRUE

## 2025-07-18 ASSESSMENT — ENCOUNTER SYMPTOMS
COLOR CHANGE: 0
TROUBLE SWALLOWING: 0
EYE PAIN: 0
SHORTNESS OF BREATH: 0
EYE REDNESS: 0
VOMITING: 0
COUGH: 0
WHEEZING: 0
SORE THROAT: 0
CHEST TIGHTNESS: 0
NAUSEA: 0
EYE DISCHARGE: 0
ABDOMINAL PAIN: 0

## 2025-07-18 ASSESSMENT — PATIENT HEALTH QUESTIONNAIRE - PHQ9
SUM OF ALL RESPONSES TO PHQ QUESTIONS 1-9: 0
1. LITTLE INTEREST OR PLEASURE IN DOING THINGS: NOT AT ALL
SUM OF ALL RESPONSES TO PHQ QUESTIONS 1-9: 0
2. FEELING DOWN, DEPRESSED OR HOPELESS: NOT AT ALL

## 2025-07-18 NOTE — H&P
History & Physical    Name: Dorene Quick MRN: [id number]  SSN: [id number]    YOB: 2005  Age: 20 y.o.  Sex: female        Estimated Date of Delivery: None noted.  OB History    Para Term  AB Living   1 0 0 0 0 0   SAB IAB Ectopic Molar Multiple Live Births   0 0 0 0 0 0      # Outcome Date GA Lbr Randal/2nd Weight Sex Type Anes PTL Lv   1 Current               Obstetric Comments   Midwife Carla Sharpe in [place] at [facility]       Chief Complaint   Patient presents with    Decreased Fetal Movement    Swelling         Ms. Quick is a 19yo  @ 38w3d with JOSE 2025 who presented to L&D with decreased fetal movement. She receives her prenatal care at [facility] (prenatal record in the system) but is here in [place] with her mum. She is not sure where she will deliver. She was unable to get an appointment here in [facility]. She states that her prenatal course was uncomplicated but she had GBS in her urine culture at her IPV and would be treated in labor. She has no history of elevated BP or headaches or visual change. She states that her baby was not as active as she usually is. She also complains of edema of her lower extremities.    Past Medical History:   Diagnosis Date    Asthma     No problems since childhood, approx 10 years ago when used inhaler PRN    Pre-diabetes     Age [age] per patient     Past Surgical History:   Procedure Laterality Date    WISDOM TOOTH EXTRACTION       Social History     Occupational History    Not on file   Tobacco Use    Smoking status: Never     Passive exposure: Never    Smokeless tobacco: Never   Vaping Use    Vaping status: Not on file   Substance and Sexual Activity    Alcohol use: Not Currently    Drug use: Not Currently     Types: Marijuana (Weed)    Sexual activity: Yes     Partners: Male     Comment:      History reviewed. No pertinent family history.    No Known Allergies  Prior to Admission medications    Medication Sig Start Date

## 2025-07-18 NOTE — PROGRESS NOTES
Subjective  Chief Complaint   Patient presents with    Blood Sugar Problem     Blood sugar is dropping consistently     HPI:  Dorene Quick is a 20 y.o. female with medical history as reviewed and included.     Patient presents to the clinic for an acute care visit for evaluation at 38 weeks of gestation with worsening low blood glucose/hypoglycemia.  Patient denies history of diabetes mellitus, hypoglycemic agents or insulin.  Patient endorses regular dietary intake consisting of 3 meals per day with occasional snacks.  Reviewed patient's medical record and noted most recent laboratory serum results on 5/13/2025 noted gestational diabetes 1 hour screening with the result 66. Also noted most recent obtained 2/4/2025 glucose result of 68, hemoglobin A1c  result of 5.1%.  Patient denies symptoms of preeclampsia, infection, denies dizziness however during hypoglycemic episodes reports intermittent palpitations, diaphoreses and weakness.  She reports symptoms ongoing past 2 weeks, patient has become symptomatic with hyperglycemia.    Counseled patient to increase protein intake, avoid prolonged fasting, and distribute caloric intake more evenly throughout the day.  Advised patient to consume 5-6 small balanced meals daily and include protein rich snacks i.e. nuts, cheese, yogurt, boiled eggs.  Between meals to maintain stable blood glucose levels.  Encourage patient to monitor symptoms of hypoglycemia and to maintain adequate hydration.  Also advised patient to continue counseling services and avoid stressful situations, encourage continual surveillance and medical supervision of obstetrics.      Review of Systems   Constitutional:  Negative for chills, fatigue and fever.   HENT:  Negative for ear pain, hearing loss, sore throat and trouble swallowing.    Eyes:  Negative for pain, discharge and redness.   Respiratory:  Negative for cough, chest tightness, shortness of breath and wheezing.    Gastrointestinal:

## 2025-07-18 NOTE — PROGRESS NOTES
Chief Complaint   Patient presents with    Blood Sugar Problem     Blood sugar is dropping consistently           /76 (BP Site: Left Upper Arm, Patient Position: Sitting)   Pulse 92   Temp 98.4 °F (36.9 °C) (Oral)   Resp 16   Ht 1.702 m (5' 7\")   Wt 108.9 kg (240 lb)   SpO2 97%   BMI 37.59 kg/m²         Have you been to the ER, urgent care clinic since your last visit?  Hospitalized since your last visit?   YES - When: approximately 1 days ago.  Where and Why: decreased fetal movement .    Have you seen or consulted any other health care providers outside our system since your last visit?   NO     “Have you had a pap smear?”    YES - Where: 2024 Nurse/CMA to request most recent records if not in the chart    No cervical cancer screening on file

## 2025-07-18 NOTE — PROGRESS NOTES
2004: Patient arrived to unit for evaluation/treatment of decreased fetal movement since approx 1900 today. Oriented to room with brief overview of initial treatment/assessment plan. Instructed to change into hospital gown and provide clean catch urine sample for testing as needed. Vital signs obtained. Placed on TOCO/EFM as appropriate.    2037: Dr. Briseno notified of reactive NST; will assess patient at bedside to perform BPP.    2213: Discharge instructions reviewed with patient and mother - Gianna, including when to return to ER/LD including regular/painful contractions, vaginal bleeding, leaking of fluids/suspected ROM, severe headache, worsening edema, visual changes, unresolved N/V  and/or decreased fetal movement. Instructed patient to return to L&D on Saturday 7/19 and Monday 7/21 for NST as she is out of town and without current OB provider. Patient verbalized understanding of all discharge instructions with opportunity to ask questions.  Patient ambulated off unit in stable condition accompanied by mother. All belongings taken with patient.

## 2025-07-19 ENCOUNTER — HOSPITAL ENCOUNTER (OUTPATIENT)
Facility: HOSPITAL | Age: 20
Discharge: HOME | End: 2025-07-19
Attending: OBSTETRICS & GYNECOLOGY | Admitting: OBSTETRICS & GYNECOLOGY
Payer: MEDICAID

## 2025-07-19 VITALS
BODY MASS INDEX: 37.67 KG/M2 | HEART RATE: 85 BPM | WEIGHT: 240 LBS | HEIGHT: 67 IN | RESPIRATION RATE: 16 BRPM | SYSTOLIC BLOOD PRESSURE: 132 MMHG | TEMPERATURE: 98.8 F | DIASTOLIC BLOOD PRESSURE: 89 MMHG | OXYGEN SATURATION: 98 %

## 2025-07-19 LAB
BACTERIA SPEC CULT: NORMAL
Lab: NORMAL

## 2025-07-19 PROCEDURE — 59025 FETAL NON-STRESS TEST: CPT

## 2025-07-19 PROCEDURE — 99212 OFFICE O/P EST SF 10 MIN: CPT

## 2025-07-19 NOTE — PROGRESS NOTES
0943-pt to L&D from home for scheduled NST, per dr Briseno. Pt of [place], at [facility]. Pt states that [place] recently moved to [place] she was receiving prenatal care. Pt states that she has not switched over to a new provider in [place] due to waiting on insurance to switch over. Pt aware that we can ask for an appt with one of our OB docs, pt refused at this time. Pt aware of NST order. No complaints at this time. Endorses fetal movement. Denies complications during/outside of pregnancy.    Per pt my chart records:    5/13/2025:   Rpr-nr  Hiv-nr  2/19/2025:  hep b-neg  Hep c-neg  Rpr-nr  Hiv-nr  Rubella immune    Pt states that she is gbs positive. Unable to locate results in chart.     Pt has written birth plan. Plans to go natural, no visitors or support at bedside. Pt states that she no longer plans to have fob in room for support. In need of breast pump. Will follow up with LC once delivered.     1028-dr nicole called. Strip reviewed with md. NST passed. Pt to be discharged home    1042-discharge instructions reviewed with pt. Hydration, kick counts, and precautions reviewed. Pt to come back in on Monday at 10am to be seen by dr briseno and nst. Pt discharged off unit ambulatory

## 2025-07-21 ENCOUNTER — HOSPITAL ENCOUNTER (OUTPATIENT)
Facility: HOSPITAL | Age: 20
Discharge: HOME | End: 2025-07-21
Attending: OBSTETRICS & GYNECOLOGY | Admitting: OBSTETRICS & GYNECOLOGY
Payer: MEDICAID

## 2025-07-21 VITALS
TEMPERATURE: 98.5 F | DIASTOLIC BLOOD PRESSURE: 75 MMHG | OXYGEN SATURATION: 95 % | SYSTOLIC BLOOD PRESSURE: 128 MMHG | BODY MASS INDEX: 37.67 KG/M2 | WEIGHT: 240 LBS | RESPIRATION RATE: 16 BRPM | HEART RATE: 83 BPM | HEIGHT: 67 IN

## 2025-07-21 LAB
APPEARANCE UR: CLEAR
BACTERIA URNS QL MICRO: ABNORMAL /HPF
BILIRUB UR QL: NEGATIVE
COLOR UR: ABNORMAL
EPITH CASTS URNS QL MICRO: ABNORMAL /LPF
GLUCOSE UR STRIP.AUTO-MCNC: NEGATIVE MG/DL
HGB UR QL STRIP: NEGATIVE
KETONES UR QL STRIP.AUTO: NEGATIVE MG/DL
LEUKOCYTE ESTERASE UR QL STRIP.AUTO: NEGATIVE
MUCOUS THREADS URNS QL MICRO: ABNORMAL /LPF
NITRITE UR QL STRIP.AUTO: NEGATIVE
PH UR STRIP: 6 (ref 5–8)
PROT UR STRIP-MCNC: NEGATIVE MG/DL
RBC #/AREA URNS HPF: ABNORMAL /HPF (ref 0–5)
SP GR UR REFRACTOMETRY: 1.02 (ref 1–1.03)
UROBILINOGEN UR QL STRIP.AUTO: 0.1 EU/DL (ref 0.1–1)
WBC URNS QL MICRO: ABNORMAL /HPF (ref 0–4)

## 2025-07-21 PROCEDURE — 99213 OFFICE O/P EST LOW 20 MIN: CPT

## 2025-07-21 PROCEDURE — 59025 FETAL NON-STRESS TEST: CPT

## 2025-07-21 PROCEDURE — 81003 URINALYSIS AUTO W/O SCOPE: CPT

## 2025-07-21 NOTE — H&P
History & Physical    Name: Dorene Quick MRN: [id number]  SSN: [id number]    YOB: 2005  Age: 20 y.o.  Sex: female        Estimated Date of Delivery: None noted.  OB History    Para Term  AB Living   1 0 0 0 0 0   SAB IAB Ectopic Molar Multiple Live Births   0 0 0 0 0 0      # Outcome Date GA Lbr Randal/2nd Weight Sex Type Anes PTL Lv   1 Current               Obstetric Comments   [facility] [place] [facility]       Chief Complaint   Patient presents with    Non-stress Test         Ms. Quick is a 19yo  @ 38.6 weeks who presented for NST. [facility] [place]. She is having difficulty scheduling an appointment with a female provider. She was seen on  with borderline fluid and followed up on Saturday for NST that was reactive. Today, she has no complaints, no contractions, no vaginal bleeding, no leakage of fluid. Good fetal movement. She states that her leg and feet are still swollen.    Past Medical History:   Diagnosis Date    Asthma     No problems since childhood, approx 10 years ago when used inhaler PRN    Pre-diabetes     Age [age] per patient     Past Surgical History:   Procedure Laterality Date    WISDOM TOOTH EXTRACTION       Social History     Occupational History    Not on file   Tobacco Use    Smoking status: Never     Passive exposure: Never    Smokeless tobacco: Never   Vaping Use    Vaping status: Not on file   Substance and Sexual Activity    Alcohol use: Not Currently    Drug use: Not Currently     Types: Marijuana (Weed)    Sexual activity: Yes     Partners: Male     Comment:      No family history on file.    No Known Allergies  Prior to Admission medications    Medication Sig Start Date End Date Taking? Authorizing Provider   Probiotic Product (VH ESSENTIALS OPTIBALANCE) CAPS Take by mouth   Yes Provider, MD Amando   Prenatal Vit-Fe Fumarate-FA (PRENATAL PO) Take by mouth  Patient not

## 2025-07-21 NOTE — PROGRESS NOTES
0945: Pt ambulated to unit for NST. To Ld 6. She repos upper back pain that ha been on going rated at 4/10. Denies vaginal bleeding or leaking fluid. Endorses fetal movement.   1005:Dr. Briseno notified about pt.  1100: Dr. Briseno at bedside. Strip reviewed and is reactive.   1109: Bedside ultrasound being done by MD   1125: CATARINA 12.4 per M and is for discharge to return 7/28/25 if not delivered. Pt does not want to IOL.   1133: Discharge instructions discussed with pt.  Pt to call or return if signs of labor such as vaginal bleeding, leaking fluid, regular, strong contractions (explained how to time contractions), back pain or pelvic pressure that doesn't go away. Baby not moving as before. Or any questions or concerns. Po hydration encouraged. Pt verbalized understanding and had no questions or concerns.   1138: Pt ambulated of unit for home. Printed discharge papers given.

## 2025-07-28 ENCOUNTER — ANESTHESIA EVENT (OUTPATIENT)
Facility: HOSPITAL | Age: 20
DRG: 560 | End: 2025-07-28
Payer: MEDICAID

## 2025-07-28 ENCOUNTER — HOSPITAL ENCOUNTER (INPATIENT)
Facility: HOSPITAL | Age: 20
LOS: 3 days | Discharge: HOME | DRG: 560 | End: 2025-07-31
Attending: OBSTETRICS & GYNECOLOGY | Admitting: OBSTETRICS & GYNECOLOGY
Payer: MEDICAID

## 2025-07-28 ENCOUNTER — ANESTHESIA (OUTPATIENT)
Facility: HOSPITAL | Age: 20
DRG: 560 | End: 2025-07-28
Payer: MEDICAID

## 2025-07-28 PROBLEM — Z34.90 ENCOUNTER FOR ELECTIVE INDUCTION OF LABOR: Status: ACTIVE | Noted: 2025-07-28

## 2025-07-28 PROBLEM — Z3A.39 39 WEEKS GESTATION OF PREGNANCY: Status: ACTIVE | Noted: 2025-07-28

## 2025-07-28 LAB
ABO + RH BLD: NORMAL
ALBUMIN SERPL-MCNC: 2.5 G/DL (ref 3.5–5)
ALBUMIN/GLOB SERPL: 0.6 (ref 1.1–2.2)
ALP SERPL-CCNC: 157 U/L (ref 45–117)
ALT SERPL-CCNC: 26 U/L (ref 12–78)
AMPHET UR QL SCN: NEGATIVE
ANION GAP SERPL CALC-SCNC: 7 MMOL/L (ref 2–12)
APPEARANCE UR: ABNORMAL
AST SERPL W P-5'-P-CCNC: 17 U/L (ref 15–37)
BACTERIA URNS QL MICRO: ABNORMAL /HPF
BARBITURATES UR QL SCN: NEGATIVE
BASOPHILS # BLD: 0.04 K/UL (ref 0–0.1)
BASOPHILS NFR BLD: 0.3 % (ref 0–1)
BENZODIAZ UR QL: NEGATIVE
BILIRUB SERPL-MCNC: 0.2 MG/DL (ref 0.2–1)
BILIRUB UR QL: NEGATIVE
BLOOD GROUP ANTIBODIES SERPL: NEGATIVE
BUN SERPL-MCNC: 11 MG/DL (ref 6–20)
BUN/CREAT SERPL: 15 (ref 12–20)
CA-I BLD-MCNC: 9.6 MG/DL (ref 8.5–10.1)
CANNABINOIDS UR QL SCN: NEGATIVE
CHLORIDE SERPL-SCNC: 108 MMOL/L (ref 97–108)
CO2 SERPL-SCNC: 22 MMOL/L (ref 21–32)
COCAINE UR QL SCN: NEGATIVE
COLOR UR: ABNORMAL
CREAT SERPL-MCNC: 0.71 MG/DL (ref 0.55–1.02)
CREAT UR-MCNC: 254 MG/DL
DIFFERENTIAL METHOD BLD: ABNORMAL
EOSINOPHIL # BLD: 0.13 K/UL (ref 0–0.4)
EOSINOPHIL NFR BLD: 1 % (ref 0–7)
EPITH CASTS URNS QL MICRO: ABNORMAL /LPF
ERYTHROCYTE [DISTWIDTH] IN BLOOD BY AUTOMATED COUNT: 15.5 % (ref 11.5–14.5)
GLOBULIN SER CALC-MCNC: 4.2 G/DL (ref 2–4)
GLUCOSE SERPL-MCNC: 67 MG/DL (ref 65–100)
GLUCOSE UR STRIP.AUTO-MCNC: NEGATIVE MG/DL
HCT VFR BLD AUTO: 35.9 % (ref 35–47)
HGB BLD-MCNC: 12 G/DL (ref 11.5–16)
HGB UR QL STRIP: NEGATIVE
IMM GRANULOCYTES # BLD AUTO: 0.14 K/UL (ref 0–0.04)
IMM GRANULOCYTES NFR BLD AUTO: 1.1 % (ref 0–0.5)
KETONES UR QL STRIP.AUTO: NEGATIVE MG/DL
LEUKOCYTE ESTERASE UR QL STRIP.AUTO: ABNORMAL
LYMPHOCYTES # BLD: 1.4 K/UL (ref 0.8–3.5)
LYMPHOCYTES NFR BLD: 10.6 % (ref 12–49)
Lab: NORMAL
MCH RBC QN AUTO: 29.5 PG (ref 26–34)
MCHC RBC AUTO-ENTMCNC: 33.4 G/DL (ref 30–36.5)
MCV RBC AUTO: 88.2 FL (ref 80–99)
METHADONE UR QL: NEGATIVE
MONOCYTES # BLD: 1.11 K/UL (ref 0–1)
MONOCYTES NFR BLD: 8.4 % (ref 5–13)
MUCOUS THREADS URNS QL MICRO: ABNORMAL /LPF
NEUTS SEG # BLD: 10.34 K/UL (ref 1.8–8)
NEUTS SEG NFR BLD: 78.6 % (ref 32–75)
NITRITE UR QL STRIP.AUTO: NEGATIVE
NRBC # BLD: 0 K/UL (ref 0–0.01)
NRBC BLD-RTO: 0 PER 100 WBC
OPIATES UR QL: NEGATIVE
PCP UR QL: NEGATIVE
PH UR STRIP: 6 (ref 5–8)
PLATELET # BLD AUTO: 239 K/UL (ref 150–400)
PMV BLD AUTO: 12.2 FL (ref 8.9–12.9)
POTASSIUM SERPL-SCNC: 4.5 MMOL/L (ref 3.5–5.1)
PROT SERPL-MCNC: 6.7 G/DL (ref 6.4–8.2)
PROT UR STRIP-MCNC: 100 MG/DL
PROT UR-MCNC: 51 MG/DL (ref 0–11.9)
PROT/CREAT UR-RTO: 0.2
RBC # BLD AUTO: 4.07 M/UL (ref 3.8–5.2)
RBC #/AREA URNS HPF: ABNORMAL /HPF (ref 0–5)
SODIUM SERPL-SCNC: 137 MMOL/L (ref 136–145)
SP GR UR REFRACTOMETRY: 1.03 (ref 1–1.03)
SPECIMEN EXP DATE BLD: NORMAL
UROBILINOGEN UR QL STRIP.AUTO: 0.1 EU/DL (ref 0.1–1)
WBC # BLD AUTO: 13.2 K/UL (ref 3.6–11)
WBC URNS QL MICRO: ABNORMAL /HPF (ref 0–4)

## 2025-07-28 PROCEDURE — 2580000003 HC RX 258: Performed by: OBSTETRICS & GYNECOLOGY

## 2025-07-28 PROCEDURE — 6360000002 HC RX W HCPCS

## 2025-07-28 PROCEDURE — 80307 DRUG TEST PRSMV CHEM ANLYZR: CPT

## 2025-07-28 PROCEDURE — 2500000003 HC RX 250 WO HCPCS: Performed by: OBSTETRICS & GYNECOLOGY

## 2025-07-28 PROCEDURE — 99213 OFFICE O/P EST LOW 20 MIN: CPT

## 2025-07-28 PROCEDURE — 86900 BLOOD TYPING SEROLOGIC ABO: CPT

## 2025-07-28 PROCEDURE — 87070 CULTURE OTHR SPECIMN AEROBIC: CPT

## 2025-07-28 PROCEDURE — 3700000025 EPIDURAL BLOCK

## 2025-07-28 PROCEDURE — 81001 URINALYSIS AUTO W/SCOPE: CPT

## 2025-07-28 PROCEDURE — 87147 CULTURE TYPE IMMUNOLOGIC: CPT

## 2025-07-28 PROCEDURE — 82570 ASSAY OF URINE CREATININE: CPT

## 2025-07-28 PROCEDURE — 1120000000 HC RM PRIVATE OB

## 2025-07-28 PROCEDURE — 59025 FETAL NON-STRESS TEST: CPT

## 2025-07-28 PROCEDURE — 86901 BLOOD TYPING SEROLOGIC RH(D): CPT

## 2025-07-28 PROCEDURE — 85025 COMPLETE CBC W/AUTO DIFF WBC: CPT

## 2025-07-28 PROCEDURE — 80053 COMPREHEN METABOLIC PANEL: CPT

## 2025-07-28 PROCEDURE — 86592 SYPHILIS TEST NON-TREP QUAL: CPT

## 2025-07-28 PROCEDURE — 86850 RBC ANTIBODY SCREEN: CPT

## 2025-07-28 PROCEDURE — 6360000002 HC RX W HCPCS: Performed by: OBSTETRICS & GYNECOLOGY

## 2025-07-28 PROCEDURE — 84156 ASSAY OF PROTEIN URINE: CPT

## 2025-07-28 PROCEDURE — 00HU33Z INSERTION OF INFUSION DEVICE INTO SPINAL CANAL, PERCUTANEOUS APPROACH: ICD-10-PCS

## 2025-07-28 PROCEDURE — 6370000000 HC RX 637 (ALT 250 FOR IP): Performed by: OBSTETRICS & GYNECOLOGY

## 2025-07-28 RX ORDER — PENICILLIN G 3000000 [IU]/50ML
3 INJECTION, SOLUTION INTRAVENOUS EVERY 4 HOURS
Status: DISCONTINUED | OUTPATIENT
Start: 2025-07-28 | End: 2025-07-29

## 2025-07-28 RX ORDER — TERBUTALINE SULFATE 1 MG/ML
0.25 INJECTION SUBCUTANEOUS
Status: DISCONTINUED | OUTPATIENT
Start: 2025-07-28 | End: 2025-07-29

## 2025-07-28 RX ORDER — SODIUM CHLORIDE 0.9 % (FLUSH) 0.9 %
5-40 SYRINGE (ML) INJECTION EVERY 12 HOURS SCHEDULED
Status: DISCONTINUED | OUTPATIENT
Start: 2025-07-28 | End: 2025-07-29

## 2025-07-28 RX ORDER — FENTANYL CITRATE 50 UG/ML
25 INJECTION, SOLUTION INTRAMUSCULAR; INTRAVENOUS
Refills: 0 | Status: DISCONTINUED | OUTPATIENT
Start: 2025-07-28 | End: 2025-07-29

## 2025-07-28 RX ORDER — MISOPROSTOL 200 UG/1
400 TABLET ORAL PRN
Status: DISCONTINUED | OUTPATIENT
Start: 2025-07-28 | End: 2025-07-29

## 2025-07-28 RX ORDER — NALBUPHINE HYDROCHLORIDE 10 MG/ML
5 INJECTION INTRAMUSCULAR; INTRAVENOUS; SUBCUTANEOUS EVERY 4 HOURS PRN
Status: DISCONTINUED | OUTPATIENT
Start: 2025-07-28 | End: 2025-07-29

## 2025-07-28 RX ORDER — FENTANYL/BUPIVACAINE/NS/PF 2-1250MCG
10 PLASTIC BAG, INJECTION (ML) INJECTION CONTINUOUS
Refills: 0 | Status: DISCONTINUED | OUTPATIENT
Start: 2025-07-28 | End: 2025-07-29

## 2025-07-28 RX ORDER — SODIUM CHLORIDE 0.9 % (FLUSH) 0.9 %
5-40 SYRINGE (ML) INJECTION PRN
Status: DISCONTINUED | OUTPATIENT
Start: 2025-07-28 | End: 2025-07-29

## 2025-07-28 RX ORDER — LIDOCAINE HYDROCHLORIDE 10 MG/ML
INJECTION, SOLUTION INFILTRATION; PERINEURAL
Status: DISCONTINUED | OUTPATIENT
Start: 2025-07-28 | End: 2025-07-29 | Stop reason: SDUPTHER

## 2025-07-28 RX ORDER — SODIUM CHLORIDE 9 MG/ML
25 INJECTION, SOLUTION INTRAVENOUS PRN
Status: DISCONTINUED | OUTPATIENT
Start: 2025-07-28 | End: 2025-07-29

## 2025-07-28 RX ORDER — CARBOPROST TROMETHAMINE 250 UG/ML
250 INJECTION, SOLUTION INTRAMUSCULAR PRN
Status: DISCONTINUED | OUTPATIENT
Start: 2025-07-28 | End: 2025-07-29

## 2025-07-28 RX ORDER — SODIUM CHLORIDE, SODIUM LACTATE, POTASSIUM CHLORIDE, CALCIUM CHLORIDE 600; 310; 30; 20 MG/100ML; MG/100ML; MG/100ML; MG/100ML
INJECTION, SOLUTION INTRAVENOUS CONTINUOUS
Status: DISCONTINUED | OUTPATIENT
Start: 2025-07-28 | End: 2025-07-29

## 2025-07-28 RX ORDER — METHYLERGONOVINE MALEATE 0.2 MG/ML
200 INJECTION INTRAVENOUS PRN
Status: DISCONTINUED | OUTPATIENT
Start: 2025-07-28 | End: 2025-07-29

## 2025-07-28 RX ORDER — ONDANSETRON 2 MG/ML
4 INJECTION INTRAMUSCULAR; INTRAVENOUS EVERY 6 HOURS PRN
Status: DISCONTINUED | OUTPATIENT
Start: 2025-07-28 | End: 2025-07-29

## 2025-07-28 RX ORDER — LOPERAMIDE HYDROCHLORIDE 2 MG/1
2 CAPSULE ORAL PRN
Status: DISCONTINUED | OUTPATIENT
Start: 2025-07-28 | End: 2025-07-29

## 2025-07-28 RX ORDER — EPHEDRINE SULFATE 50 MG/ML
10 INJECTION INTRAVENOUS
Status: DISCONTINUED | OUTPATIENT
Start: 2025-07-28 | End: 2025-07-29

## 2025-07-28 RX ORDER — LIDOCAINE HYDROCHLORIDE AND EPINEPHRINE 20; 5 MG/ML; UG/ML
INJECTION, SOLUTION EPIDURAL; INFILTRATION; INTRACAUDAL; PERINEURAL
Status: DISCONTINUED | OUTPATIENT
Start: 2025-07-28 | End: 2025-07-29 | Stop reason: SDUPTHER

## 2025-07-28 RX ORDER — ONDANSETRON 4 MG/1
4 TABLET, ORALLY DISINTEGRATING ORAL EVERY 8 HOURS PRN
Status: DISCONTINUED | OUTPATIENT
Start: 2025-07-28 | End: 2025-07-29

## 2025-07-28 RX ORDER — SODIUM CHLORIDE, SODIUM LACTATE, POTASSIUM CHLORIDE, AND CALCIUM CHLORIDE .6; .31; .03; .02 G/100ML; G/100ML; G/100ML; G/100ML
500 INJECTION, SOLUTION INTRAVENOUS PRN
Status: DISCONTINUED | OUTPATIENT
Start: 2025-07-28 | End: 2025-07-29

## 2025-07-28 RX ORDER — PENICILLIN G POTASSIUM 5000000 [IU]/1
INJECTION, POWDER, FOR SOLUTION INTRAMUSCULAR; INTRAVENOUS
Status: DISPENSED
Start: 2025-07-28 | End: 2025-07-28

## 2025-07-28 RX ADMIN — PENICILLIN G 3 MILLION UNITS: 3000000 INJECTION, SOLUTION INTRAVENOUS at 22:41

## 2025-07-28 RX ADMIN — LIDOCAINE HYDROCHLORIDE AND EPINEPHRINE 3 ML: 20; 5 INJECTION, SOLUTION EPIDURAL; INFILTRATION; INTRACAUDAL; PERINEURAL at 23:11

## 2025-07-28 RX ADMIN — SODIUM CHLORIDE, SODIUM LACTATE, POTASSIUM CHLORIDE, AND CALCIUM CHLORIDE: .6; .31; .03; .02 INJECTION, SOLUTION INTRAVENOUS at 22:03

## 2025-07-28 RX ADMIN — Medication 25 MCG: at 15:08

## 2025-07-28 RX ADMIN — PENICILLIN G POTASSIUM 5 MILLION UNITS: 5000000 INJECTION, POWDER, FOR SOLUTION INTRAMUSCULAR; INTRAVENOUS at 12:28

## 2025-07-28 RX ADMIN — FENTANYL CITRATE 25 MCG: 50 INJECTION, SOLUTION INTRAMUSCULAR; INTRAVENOUS at 22:08

## 2025-07-28 RX ADMIN — LIDOCAINE HYDROCHLORIDE 3 MG: 10 INJECTION, SOLUTION INFILTRATION; PERINEURAL at 23:04

## 2025-07-28 RX ADMIN — SODIUM CHLORIDE, SODIUM LACTATE, POTASSIUM CHLORIDE, AND CALCIUM CHLORIDE: .6; .31; .03; .02 INJECTION, SOLUTION INTRAVENOUS at 12:28

## 2025-07-28 RX ADMIN — Medication 50 MCG: at 19:37

## 2025-07-28 RX ADMIN — PENICILLIN G POTASSIUM 2.5 MILLION UNITS: 5000000 INJECTION, POWDER, FOR SOLUTION INTRAMUSCULAR; INTRAVENOUS at 18:29

## 2025-07-28 RX ADMIN — SODIUM CHLORIDE, PRESERVATIVE FREE 10 ML: 5 INJECTION INTRAVENOUS at 21:26

## 2025-07-28 ASSESSMENT — PAIN DESCRIPTION - LOCATION: LOCATION: ABDOMEN

## 2025-07-28 ASSESSMENT — LIFESTYLE VARIABLES
HOW MANY STANDARD DRINKS CONTAINING ALCOHOL DO YOU HAVE ON A TYPICAL DAY: PATIENT DOES NOT DRINK
HOW OFTEN DO YOU HAVE A DRINK CONTAINING ALCOHOL: NEVER

## 2025-07-28 ASSESSMENT — PAIN - FUNCTIONAL ASSESSMENT: PAIN_FUNCTIONAL_ASSESSMENT: PREVENTS OR INTERFERES SOME ACTIVE ACTIVITIES AND ADLS

## 2025-07-28 ASSESSMENT — PAIN DESCRIPTION - ORIENTATION: ORIENTATION: LOWER

## 2025-07-28 ASSESSMENT — PAIN DESCRIPTION - DESCRIPTORS: DESCRIPTORS: CRAMPING;TIGHTNESS;SHARP

## 2025-07-28 ASSESSMENT — PAIN SCALES - GENERAL: PAINLEVEL_OUTOF10: 7

## 2025-07-28 NOTE — PROGRESS NOTES
Spiritual Health History and Assessment/Progress Note  [facility]    Advance Care Planning,  ,  ,      Name: Dorene Quick MRN: [id number]    Age: 20 y.o.     Sex: female   Language: English   Buddhist: Bahai   39 weeks gestation of pregnancy     Date: 7/28/2025            Total Time Calculated: 60 min              Spiritual Assessment began in [facility] 3 LABOR & DELIVERY        Referral/Consult From: Nurse   Encounter Overview/Reason: Advance Care Planning  Service Provided For: Patient and family together    Ya, Belief, Meaning:   Patient identifies as spiritual, is connected with a ya tradition or spiritual practice, and has beliefs or practices that help with coping during difficult times  Family/Friends Other: unknown      Importance and Influence:  Patient has spiritual/personal beliefs that influence decisions regarding their health  Family/Friends Other: unknown    Community:  Patient feels well-supported. Support system includes: Spouse/Partner, Parent/s, and Extended family  Family/Friends Other: unknown    Assessment and Plan of Care:     Patient Interventions include: Facilitated expression of thoughts and feelings, Explored spiritual coping/struggle/distress, Engaged in theological reflection, Affirmed coping skills/support systems, Facilitated life review and/ or legacy, and Assisted in Advance Care Planning conversation  Family/Friends Interventions include: Assisted in Advance Care Planning conversation    Patient Plan of Care: Spiritual Care available upon further referral  Family/Friends Plan of Care: Spiritual Care available upon further referral     is visiting for an ACP consult. The patient and her mother were present at the time.  facilitated a dialogue with Dorene about her healthcare decision maker, healthcare decisions and anatomical gifts.    Dorene notes she is doing well at this time. She is Bahai.  provided prayer with her by bedside.

## 2025-07-28 NOTE — PROGRESS NOTES
L&D Progress Note    Admit Date: 7/28/2025      Subjective:     Patient is comfortable with irregular contractions      Review of Systems  Review of Systems     Objective:     Patient Vitals for the past 8 hrs:   BP Temp Temp src Pulse Resp SpO2 Weight   07/28/25 1535 (!) 144/87 99 °F (37.2 °C) Oral 88 -- -- --   07/28/25 1520 139/85 -- -- 82 -- -- --   07/28/25 1505 133/79 -- -- (!) 108 -- -- --   07/28/25 1423 -- -- -- -- -- -- 108.9 kg (240 lb)   07/28/25 1420 135/84 -- -- 98 -- -- --   07/28/25 1405 132/82 98.9 °F (37.2 °C) Oral 90 16 -- --   07/28/25 1305 122/71 -- -- 96 -- -- --   07/28/25 1250 132/86 -- -- 99 -- -- --   07/28/25 1236 134/84 -- -- 89 -- -- --   07/28/25 1207 (!) 141/88 -- -- 98 -- -- --   07/28/25 1150 (!) 142/86 -- -- 82 -- -- --   07/28/25 1054 -- 99 °F (37.2 °C) Oral -- -- -- --   07/28/25 1052 135/82 99 °F (37.2 °C) Oral (!) 101 17 96 % --     No intake/output data recorded.  No intake/output data recorded.    Current Facility-Administered Medications   Medication Dose Route Frequency    penicillin G potassium 6643326 units injection        lactated ringers infusion   IntraVENous Continuous    lactated ringers bolus 500 mL  500 mL IntraVENous PRN    Or    lactated ringers bolus 500 mL  500 mL IntraVENous PRN    sodium chloride flush 0.9 % injection 5-40 mL  5-40 mL IntraVENous 2 times per day    sodium chloride flush 0.9 % injection 5-40 mL  5-40 mL IntraVENous PRN    0.9 % sodium chloride infusion  25 mL IntraVENous PRN    methylergonovine (METHERGINE) injection 200 mcg  200 mcg IntraMUSCular PRN    carboprost (HEMABATE) injection 250 mcg  250 mcg IntraMUSCular PRN    miSOPROStol (CYTOTEC) tablet 400 mcg  400 mcg Buccal PRN    tranexamic acid (CYKLOKAPRON) 1,000 mg in sodium chloride 0.9 % 110 mL IVPB (addEASE)  1,000 mg IntraVENous Once PRN    loperamide (IMODIUM) capsule 2 mg  2 mg Oral PRN    oxytocin (PITOCIN) 30 units in 500 mL infusion  87.3 marzena-units/min IntraVENous Continuous

## 2025-07-28 NOTE — PROGRESS NOTES
1045-pt to L&D room 5 from home for scheduled NST with dr torrez. No new complaints. No pain. Pt endorses fetal movement.     1101-dr torrez called and aware of pt arrival. Md to come see pt.     1145-dr torrez at bedside. Bedside ultrasound performed.   U/S shows grade 2-3 placental calcifications.      Catrina-4.52        VE-1, 20%, -3, posterior    1200-plan of care reviewed with pt. Pt admitted for induction       1233-NOVII placed    1415-spiritual care at bedside for advance directive. Pt mother at bedside for support. Pt on birthing ball.     1505-md at bedside  VE- 1, 30-40%    1525-cook balloon placed. 70ml to uterine balloon/50ml to vaginal balloon. Pt tolerated well    1624-pt requesting to have some fluid removed from cook balloon. Can remove 10ml from vaginal port. Pt up to br    1745-pt up to shower. Awaiting next dose of pcn

## 2025-07-28 NOTE — ACP (ADVANCE CARE PLANNING)
Advance Care Planning     Advance Care Planning Inpatient Note  [facility] Department    Today's Date: 7/28/2025  Unit: SSR 3 LABOR & DELIVERY    Received request from admission screening.  Upon review of chart and communication with care team, patient's decision making abilities are not in question.. Patient and Parent was/were present in the room during visit.    Goals of ACP Conversation:  Discuss advance care planning documents  Facilitate a discussion related to patient's goals of care as they align with the patient's values and beliefs.    Health Care Decision Makers:       Primary Decision Maker: Holden Hall - Boyfriend - [phone]    Secondary Decision Maker: Gianna Quick - Parent - [phone]  Summary:  Completed New Documents  Updated Healthcare Decision Maker    Advance Care Planning Documents (Patient Wishes):  Healthcare Power of /Advance Directive Appointment of Health Care Agent  Living Will/Advance Directive     Assessment:   is visiting for an ACP consult. The patient and her mother were present at the time.  facilitated a dialogue with Dorene about her healthcare decision maker, healthcare decisions and anatomical gifts.     Interventions:  Provided education on documents for clarity and greater understanding  Discussed and provided education on state decision maker hierarchy  Assisted in the completion of documents according to patient's wishes at this time  Encouraged ongoing ACP conversation with future decision makers and loved ones    Care Preferences Communicated:   No    Outcomes/Plan:  ACP Discussion: Completed  New advance directive completed.  Returned original document(s) to patient, as well as copies for distribution to appointed agents  Copy of advance directive given to staff to scan into medical record.  Teach Back Method used to verify the patient's and/or Healthcare Decision Maker's understanding of key information in the advance directive

## 2025-07-28 NOTE — H&P
History & Physical    Name: Dorene Quick MRN: [id number]  SSN: [id number]    YOB: 2005  Age: 20 y.o.  Sex: female        Estimated Date of Delivery: 25  OB History    Para Term  AB Living   1 0 0 0 0 0   SAB IAB Ectopic Molar Multiple Live Births   0 0 0 0 0 0      # Outcome Date GA Lbr Randal/2nd Weight Sex Type Anes PTL Lv   1 Current               Obstetric Comments   Midwife Carla Sharpe in [place] at [facility]       Chief Complaint   Patient presents with    Non-stress Test         Ms. Quick is admitted with pregnancy at 39w6d for induction of labor due to low amniotic fluid with a grade 2-3 placenta and mildly elevated BP (preeclamptic panel pending). She initially presented on  with complaint of decreased fetal movement and had an NST and bedside USS that was normal (with borderline CATARINA). She returned last week for NSTs that were normal and a bedside USS on  that showed improved CATARINA and NST reactive (BPP 10/10). Today she presented for NST which was reactive but bedside USS showed decreased CATARINA of 4cm and Grade 2-3 placenta. She denied contractions/VB/LOF and states that there is good fetal movement. She also denied visual changes, headaches or epigastric pain.     Prenatal course was complicated by:  She receives her prenatal care at [facility] (prenatal record in the system) but is here in [place] with her mum. She was unable to get an appointment here because she requested for a female OB.  GBS positive in her initial culture     Past Medical History:   Diagnosis Date    Asthma     No problems since childhood, approx 10 years ago when used inhaler PRN    Pre-diabetes     Age [age] per patient     Past Surgical History:   Procedure Laterality Date    WISDOM TOOTH EXTRACTION       Social History     Occupational History    Not on file   Tobacco Use    Smoking status: Never     Passive exposure: Never    Smokeless tobacco: Never   Vaping Use    Vaping

## 2025-07-29 ENCOUNTER — ANESTHESIA EVENT (OUTPATIENT)
Facility: HOSPITAL | Age: 20
DRG: 560 | End: 2025-07-29
Payer: MEDICAID

## 2025-07-29 ENCOUNTER — ANESTHESIA (OUTPATIENT)
Facility: HOSPITAL | Age: 20
DRG: 560 | End: 2025-07-29
Payer: MEDICAID

## 2025-07-29 PROCEDURE — 1120000000 HC RM PRIVATE OB

## 2025-07-29 PROCEDURE — 0U7C7DJ DILATION OF CERVIX WITH INTRALUM DEV, TEMP, VIA OPENING: ICD-10-PCS | Performed by: OBSTETRICS & GYNECOLOGY

## 2025-07-29 PROCEDURE — 2500000003 HC RX 250 WO HCPCS

## 2025-07-29 PROCEDURE — 2500000003 HC RX 250 WO HCPCS: Performed by: OBSTETRICS & GYNECOLOGY

## 2025-07-29 PROCEDURE — 4A1HXCZ MONITORING OF PRODUCTS OF CONCEPTION, CARDIAC RATE, EXTERNAL APPROACH: ICD-10-PCS | Performed by: OBSTETRICS & GYNECOLOGY

## 2025-07-29 PROCEDURE — 10907ZC DRAINAGE OF AMNIOTIC FLUID, THERAPEUTIC FROM PRODUCTS OF CONCEPTION, VIA NATURAL OR ARTIFICIAL OPENING: ICD-10-PCS | Performed by: OBSTETRICS & GYNECOLOGY

## 2025-07-29 PROCEDURE — 3E0P7VZ INTRODUCTION OF HORMONE INTO FEMALE REPRODUCTIVE, VIA NATURAL OR ARTIFICIAL OPENING: ICD-10-PCS | Performed by: OBSTETRICS & GYNECOLOGY

## 2025-07-29 PROCEDURE — 6360000002 HC RX W HCPCS: Performed by: OBSTETRICS & GYNECOLOGY

## 2025-07-29 PROCEDURE — 6370000000 HC RX 637 (ALT 250 FOR IP): Performed by: OBSTETRICS & GYNECOLOGY

## 2025-07-29 PROCEDURE — 7210000100 HC LABOR FEE PER 1 HR

## 2025-07-29 PROCEDURE — 94761 N-INVAS EAR/PLS OXIMETRY MLT: CPT

## 2025-07-29 PROCEDURE — 0UQMXZZ REPAIR VULVA, EXTERNAL APPROACH: ICD-10-PCS | Performed by: OBSTETRICS & GYNECOLOGY

## 2025-07-29 PROCEDURE — 7220000101 HC DELIVERY VAGINAL/SINGLE

## 2025-07-29 PROCEDURE — 0HQ9XZZ REPAIR PERINEUM SKIN, EXTERNAL APPROACH: ICD-10-PCS | Performed by: OBSTETRICS & GYNECOLOGY

## 2025-07-29 RX ORDER — DOCUSATE SODIUM 100 MG/1
100 CAPSULE, LIQUID FILLED ORAL 2 TIMES DAILY
Status: DISCONTINUED | OUTPATIENT
Start: 2025-07-29 | End: 2025-07-31 | Stop reason: HOSPADM

## 2025-07-29 RX ORDER — ONDANSETRON 4 MG/1
4 TABLET, ORALLY DISINTEGRATING ORAL EVERY 6 HOURS PRN
Status: DISCONTINUED | OUTPATIENT
Start: 2025-07-29 | End: 2025-07-31 | Stop reason: HOSPADM

## 2025-07-29 RX ORDER — ACETAMINOPHEN 500 MG
1000 TABLET ORAL EVERY 8 HOURS PRN
Status: DISCONTINUED | OUTPATIENT
Start: 2025-07-29 | End: 2025-07-29

## 2025-07-29 RX ORDER — SODIUM CHLORIDE 0.9 % (FLUSH) 0.9 %
5-40 SYRINGE (ML) INJECTION PRN
Status: DISCONTINUED | OUTPATIENT
Start: 2025-07-29 | End: 2025-07-31 | Stop reason: HOSPADM

## 2025-07-29 RX ORDER — SODIUM CHLORIDE 0.9 % (FLUSH) 0.9 %
5-40 SYRINGE (ML) INJECTION EVERY 12 HOURS SCHEDULED
Status: DISCONTINUED | OUTPATIENT
Start: 2025-07-29 | End: 2025-07-31 | Stop reason: HOSPADM

## 2025-07-29 RX ORDER — IBUPROFEN 800 MG/1
800 TABLET, FILM COATED ORAL EVERY 8 HOURS SCHEDULED
Status: DISCONTINUED | OUTPATIENT
Start: 2025-07-29 | End: 2025-07-31 | Stop reason: HOSPADM

## 2025-07-29 RX ORDER — ONDANSETRON 2 MG/ML
4 INJECTION INTRAMUSCULAR; INTRAVENOUS EVERY 6 HOURS PRN
Status: DISCONTINUED | OUTPATIENT
Start: 2025-07-29 | End: 2025-07-31 | Stop reason: HOSPADM

## 2025-07-29 RX ORDER — LOPERAMIDE HYDROCHLORIDE 2 MG/1
2 CAPSULE ORAL PRN
Status: DISCONTINUED | OUTPATIENT
Start: 2025-07-29 | End: 2025-07-31 | Stop reason: HOSPADM

## 2025-07-29 RX ORDER — MODIFIED LANOLIN
OINTMENT (GRAM) TOPICAL PRN
Status: DISCONTINUED | OUTPATIENT
Start: 2025-07-29 | End: 2025-07-31 | Stop reason: HOSPADM

## 2025-07-29 RX ORDER — SODIUM CHLORIDE 9 MG/ML
INJECTION, SOLUTION INTRAVENOUS PRN
Status: DISCONTINUED | OUTPATIENT
Start: 2025-07-29 | End: 2025-07-31 | Stop reason: HOSPADM

## 2025-07-29 RX ORDER — ACETAMINOPHEN 500 MG
1000 TABLET ORAL EVERY 8 HOURS SCHEDULED
Status: DISCONTINUED | OUTPATIENT
Start: 2025-07-29 | End: 2025-07-31 | Stop reason: HOSPADM

## 2025-07-29 RX ADMIN — Medication 10 ML/HR: at 00:02

## 2025-07-29 RX ADMIN — DOCUSATE SODIUM 100 MG: 100 CAPSULE, LIQUID FILLED ORAL at 20:54

## 2025-07-29 RX ADMIN — ACETAMINOPHEN 1000 MG: 500 TABLET ORAL at 20:53

## 2025-07-29 RX ADMIN — IBUPROFEN 800 MG: 800 TABLET, FILM COATED ORAL at 14:34

## 2025-07-29 RX ADMIN — Medication 1 MILLI-UNITS/MIN: at 00:40

## 2025-07-29 RX ADMIN — IBUPROFEN 800 MG: 800 TABLET, FILM COATED ORAL at 05:03

## 2025-07-29 RX ADMIN — ACETAMINOPHEN 1000 MG: 500 TABLET ORAL at 10:12

## 2025-07-29 RX ADMIN — IBUPROFEN 800 MG: 800 TABLET, FILM COATED ORAL at 23:19

## 2025-07-29 RX ADMIN — SODIUM CHLORIDE, PRESERVATIVE FREE 10 ML: 5 INJECTION INTRAVENOUS at 21:04

## 2025-07-29 RX ADMIN — DOCUSATE SODIUM 100 MG: 100 CAPSULE, LIQUID FILLED ORAL at 10:12

## 2025-07-29 ASSESSMENT — PAIN DESCRIPTION - LOCATION: LOCATION: PERINEUM;BACK

## 2025-07-29 ASSESSMENT — PAIN SCALES - GENERAL
PAINLEVEL_OUTOF10: 0
PAINLEVEL_OUTOF10: 0
PAINLEVEL_OUTOF10: 2

## 2025-07-29 ASSESSMENT — PAIN - FUNCTIONAL ASSESSMENT: PAIN_FUNCTIONAL_ASSESSMENT: ACTIVITIES ARE NOT PREVENTED

## 2025-07-29 ASSESSMENT — PAIN DESCRIPTION - ORIENTATION: ORIENTATION: LOWER

## 2025-07-29 ASSESSMENT — PAIN DESCRIPTION - DESCRIPTORS: DESCRIPTORS: SORE

## 2025-07-29 NOTE — ANESTHESIA PROCEDURE NOTES
Epidural Block    Patient location during procedure: OB  Start time: 7/28/2025 10:59 PM  End time: 7/28/2025 11:13 PM  Reason for block: labor epidural  Staffing  Performed: resident/CRNA   Resident/CRNA: Yang Martin APRN - CRNA  Performed by: Yang Martin APRN - CRNA  Authorized by: Yang aMrtin APRN - CRNA    Epidural  Patient position: sitting  Prep: Betadine  Patient monitoring: cardiac monitor, continuous pulse ox, capnometry and frequent blood pressure checks  Approach: midline  Location: L4-5  Injection technique: JAMI air  Provider prep: mask, sterile gloves and sterile gown  Needle  Needle type: Tuohy   Needle gauge: 20 G  Needle length: 3.5 in  Needle insertion depth: 8 cm  Catheter type: multi-orifice  Catheter size: 19 G  Catheter at skin depth: 12 cm  Test dose: negativeCatheter Secured: tegaderm  Assessment  Hemodynamics: stable  Attempts: 1  Outcomes: uncomplicated  Additional Notes  Pt tolerated procedure.  Preanesthetic Checklist  Completed: patient identified, IV checked, site marked, risks and benefits discussed, surgical/procedural consents, equipment checked, pre-op evaluation, timeout performed, anesthesia consent given, oxygen available, monitors applied/VS acknowledged, fire risk safety assessment completed and verbalized and blood product R/B/A discussed and consented

## 2025-07-29 NOTE — L&D DELIVERY NOTE
Alex Qucik [[id number]]      Labor Events     Labor: No  Cervical Ripening Date/Time:      Cervical Ripening Type: Misoprostol, Trores/EASI  Antibiotics Received during Labor: Yes  Rupture Date/Time:  25 01:33:00   Rupture Type: AROM  Fluid Color: Clear  Fluid Odor: None  Fluid Volume: Scant  Induction: Misoprostol, Cervical Ripening Balloon       Anesthesia    Method: Spinal, Epidural, Other       Labor Event Times      Labor onset date/time:        Dilation complete date/time:  25 00:51:00     Start pushing date/time:  2025 01:24:00   Decision date/time (emergent ):            Delivery Details      Delivery Date: 25 Delivery Time: 02:03:00                  Presentation    Presentation: Vertex  Position: Right  _: Occiput  _: Transverse       Shoulder Dystocia    Shoulder Dystocia Present?: No       Assisted Delivery Details    Forceps Attempted?: No  Vacuum Extractor Attempted?: No                           Cord    Vessels: 3 Vessels  Complications: None  Delayed Cord Clamping?: Yes  Cord Clamped Date/Time: 2025 02:05:00  Cord Blood Disposition: Lab  Gases Sent?: No              Placenta    Date/Time: 2025 02:13:00  Removal: Expressed  Appearance: Intact  Disposition: Family       Lacerations    Episiotomy: None  Perineal Lacerations: 1st  Other Lacerations: periurethral laceration  Periurethral Laceration: Left Repaired?: Yes   Number of Repair Packets: 1       Vaginal Counts    Initial Count Personnel: YEIMI SANTIAGO RN  Initial Count Verified By: DR. PATEL  Intial Sponge Count: Correct Intial Needles Count: Correct Intial Instruments Count: Correct   Final Sponges Count: Correct Final Needles  Count: Correct Final Instruments Count: Correct   Final Count Personnel: DR. PATEL  Final Count Verified By: YEIMI SANTIAGO RN       Blood Loss  Mother: Dorene Quick #[id number]     Start of Mother's Information      Delivery Blood Loss   Intrapartum & Postpartum:

## 2025-07-29 NOTE — PROGRESS NOTES
0002  Continuous epidural started.  Writer gave pt PCEA button with instructions to press every 10 minutes as needed for left sided pain to subside.  Pt demonstrated understanding.    0030  See monitor strip for documentation on strip.  Not flowing over into epic.    0430  Dr. Briseno aware of PPH score of 11.  No new orders.

## 2025-07-29 NOTE — LACTATION NOTE
This note was copied from a baby's chart.  1050 - Came to room to help mom with latching baby as she said baby was struggling to feed on right breast.  Upon arrival, FOB is helping mom with latching baby in prone position on right breast.  With permission, assisted mom with latching baby on right breast in football hold.  Infant is quiet and alert, but does not seem interested in feeding at this time.  Attempts made to stimulate suck using gloved finger; infant gagging/spitty; reassured mom and FOB this is very normal  behavior.  Mom reports baby \"fed so well before\"; informed mom and FOB that newborns tend to feed very well just after birth, but then get rather sleepy for the rest of the day as they are also recovering from birth.  Several attempts made to get infant to latch with no success.  Mom agreeable to hand expression at this time.  Assisted with hand expressing colostrum from right breast; infant syringe fed 2ml by this LC; infant would not take anymore as she is very spitty/gaggy.  Pediatrician came to room to assess infant; provider also reassured mom that gagging is very normal for first day or so; mom and FOB acknowledged understanding.  Infant dressed, swaddled, and placed supine in bassinet.  Mother's efforts encouraged and praised.  Opportunity for questions and/or clarification provided.  Aware of my availability; name and phone number written on whiteboard in room.  Advised to call for lactation assistance as needed.

## 2025-07-29 NOTE — LACTATION NOTE
This note was copied from a baby's chart.  1010 - Met with mom to discuss breastfeeding plan and progress, and to offer lactation support.  Mom reports her plan is to have baby room in with her, and to exclusively breastfeed for as long as she can.  FOB is at bedside and supportive of mom's feeding plan.  This is mom's 1st baby.  Encouraged frequent, baby-led feedings using early hunger cues, with goal of 8-10 feeds/24 hours.  While using one hand to hold her breast and one hand to hold infant, encouraged mother to stimulate infant's lips with her nipple until she sees infant open wide, and then to guide infant to her breast, leading with baby's chin and aiming her nipple towards the nose and roof of the baby's mouth.  Stressed the importance of holding baby firmly at the nape of the neck with mom's palm held firmly against the top of baby's back to help get a deep, asymmetrical latch, while being \"tummy-to-tummy\" with mom.  The baby should be \"hugging\" the breast. This will ensure better milk transfer and to aid against nipple soreness.  Audible swallows should be heard.  Baby should be actively engaged in each feed for a minimum of 10 minutes; informed mom it is normal  behavior to take pauses, but to not allow infant to stop for too long and fall asleep during feeding.  Advised to offer both breasts at each session, while alternating which breast is offered first.  Informed mom to be aware of baby's body positioning with ear, shoulder, and hip in alignment.  Spoke of being aware of baby's mouth position, holding breast by making a \"sandwich\" to fit, and to look for \"fish lips\" to help ensure a deeper latch.  Encouraged mom to break the seal of the latch if there is any pain/pinching, then to relatch baby encouraging him/her to open mouth wider.  Skin to skin promoted, especially when trying to arouse baby to feed. Counseled mom on waiting 3-4 weeks before introducing artificial bottle nipples and/or

## 2025-07-29 NOTE — PROGRESS NOTES
0435: TRANSFER - IN REPORT:    Verbal report received from ALEKSANDRA Prado RN on Dorene ALMANZAR Abdelrahman  being received from L&D for routine progression of patient care      Report consisted of patient's Situation, Background, Assessment and   Recommendations(SBAR).     Information from the following report(s) Nurse Handoff Report, Adult Overview, MAR, Recent Results, and Med Rec Status was reviewed with the receiving nurse.    Opportunity for questions and clarification was provided.      Assessment completed upon patient's arrival to unit and care assumed.      0447: Writer and L&D nurse to patient bedside. Vitals obtained and postpartum admission assessment completed. Patient oriented to Postpartum Care booklet, post birth warning signs to notify healthcare provider,  medication side effect information sheet, birth certificate worksheet, and [place]  depression scale. Patient educated to call for assistance from nurse before getting out of bed for safety. Patient voiced understanding and call bell within reach. Patient denies needing anything.

## 2025-07-29 NOTE — PROGRESS NOTES
2035  Dr. Briseno at nurses' station, requests update on pt.  Made aware of pt's oral temperature of 99.6; pt has numerous warm blankets on her for comfort.  Pt showered for comfort prior to writer's shift starting.    2039  Pt requesting to shower.  Writer to room to secure IVL.    2050  Cook cervical ripening balloon expelled with pt vomiting while on toilet.  Pt declined offer for antiemetic.  Dr. Briseno at nurses' station, made aware.    2054  Pt remains on toilet, states her mucous plug came out.    2151  Dr. Briseno at nurses' station.  Made aware of cervical exam as documented, IV remains locked per pt request and pt inquiring about what alternate is available for pain other than an epidural.  Writer informed pt IV pain med is available per provider order but may not be given closer to delivery due to effects it may have on infant at delivery.  Orders received.    2240  Pt requesting epidural.  IVF bolus started.  Epidural procedure explained to pt.  Pt verbalized understanding.    2247  Anesthesiology notified pt requesting an epidural.  Made aware of platelet count of 239.    2257  P. RANDOLPH Martin, present in pt room.    2300  Pt sitting for epidural placement.    2304  Time-out performed.    2309  CSE via CRNA.    2311  Epidural catheter placed via CRNA.    2313  Test dose via CRNA.

## 2025-07-30 LAB
BACTERIA SPEC CULT: ABNORMAL
Lab: ABNORMAL
RPR SER QL: NON REACTIVE

## 2025-07-30 PROCEDURE — 6370000000 HC RX 637 (ALT 250 FOR IP): Performed by: OBSTETRICS & GYNECOLOGY

## 2025-07-30 PROCEDURE — 1120000000 HC RM PRIVATE OB

## 2025-07-30 RX ADMIN — ACETAMINOPHEN 1000 MG: 500 TABLET ORAL at 14:17

## 2025-07-30 RX ADMIN — IBUPROFEN 800 MG: 800 TABLET, FILM COATED ORAL at 22:53

## 2025-07-30 RX ADMIN — DOCUSATE SODIUM 100 MG: 100 CAPSULE, LIQUID FILLED ORAL at 09:09

## 2025-07-30 RX ADMIN — DOCUSATE SODIUM 100 MG: 100 CAPSULE, LIQUID FILLED ORAL at 22:54

## 2025-07-30 RX ADMIN — ACETAMINOPHEN 1000 MG: 500 TABLET ORAL at 06:08

## 2025-07-30 RX ADMIN — ACETAMINOPHEN 1000 MG: 500 TABLET ORAL at 22:53

## 2025-07-30 RX ADMIN — IBUPROFEN 800 MG: 800 TABLET, FILM COATED ORAL at 14:17

## 2025-07-30 ASSESSMENT — PAIN DESCRIPTION - LOCATION
LOCATION: PERINEUM
LOCATION: ABDOMEN;BACK

## 2025-07-30 ASSESSMENT — PAIN DESCRIPTION - DESCRIPTORS: DESCRIPTORS: SORE

## 2025-07-30 ASSESSMENT — PAIN SCALES - GENERAL
PAINLEVEL_OUTOF10: 3
PAINLEVEL_OUTOF10: 2

## 2025-07-30 NOTE — PROGRESS NOTES
Post-Partum Day Number 1 Progress Note    Dorene Quick       Information for the patient's :  Abdelrahman, Alex Catherine [[id number]]   Vaginal, Spontaneous Patient doing well without significant complaint.  Breastfeeding. Moderate lochia. OOB and ambulating. Voiding without difficulty. Tolerating reg diet.       Vitals:  /77   Pulse 90   Temp 98.4 °F (36.9 °C) (Oral)   Resp 18   Wt 108.9 kg (240 lb)   SpO2 97%   Breastfeeding Unknown   BMI 37.59 kg/m²   Temp (24hrs), Av.3 °F (36.8 °C), Min:98.1 °F (36.7 °C), Max:98.4 °F (36.9 °C)        Current Facility-Administered Medications:     sodium chloride flush 0.9 % injection 5-40 mL, 5-40 mL, IntraVENous, 2 times per day, Orville Briseno MD, 10 mL at 25 210    sodium chloride flush 0.9 % injection 5-40 mL, 5-40 mL, IntraVENous, PRN, Orville Briseno MD    0.9 % sodium chloride infusion, , IntraVENous, PRN, Orville Briseno MD    loperamide (IMODIUM) capsule 2 mg, 2 mg, Oral, PRN, Orville Briseno MD    acetaminophen (TYLENOL) tablet 1,000 mg, 1,000 mg, Oral, 3 times per day, Orville Briseno MD, 1,000 mg at 25 0608    ibuprofen (ADVIL;MOTRIN) tablet 800 mg, 800 mg, Oral, 3 times per day, Orville Briseno MD, 800 mg at 25 2319    Rho(D) immune globulin (HYPERRHO;RHOGAM;RHOPHYLAC) injection 300 mcg, 300 mcg, IntraMUSCular, Once PRN, Orville Briseno MD    ondansetron (ZOFRAN-ODT) disintegrating tablet 4 mg, 4 mg, Oral, Q6H PRN **OR** ondansetron (ZOFRAN) injection 4 mg, 4 mg, IntraVENous, Q6H PRN, Orville Briseno MD    docusate sodium (COLACE) capsule 100 mg, 100 mg, Oral, BID, Orville Briseno MD, 100 mg at 25 0909    lansinoh lanolin ointment, , Topical, PRN, Orville Briseno MD    witch hazel-glycerin (TUCKS) pad, , Topical, PRN, Orville Briseno MD    benzocaine-menthol (DERMOPLAST) 20-0.5 % spray, , Topical, PRN, Orville Briseno MD    measles, mumps & rubella vaccine (MMR) injection 0.5 mL, 0.5 mL, SubCUTAneous, Prior to discharge, Orville Briseno MD

## 2025-07-31 VITALS
HEART RATE: 91 BPM | OXYGEN SATURATION: 96 % | WEIGHT: 240 LBS | BODY MASS INDEX: 37.59 KG/M2 | SYSTOLIC BLOOD PRESSURE: 117 MMHG | DIASTOLIC BLOOD PRESSURE: 72 MMHG | RESPIRATION RATE: 16 BRPM | TEMPERATURE: 97.5 F

## 2025-07-31 PROCEDURE — 6370000000 HC RX 637 (ALT 250 FOR IP): Performed by: OBSTETRICS & GYNECOLOGY

## 2025-07-31 RX ORDER — IBUPROFEN 800 MG/1
800 TABLET, FILM COATED ORAL EVERY 8 HOURS PRN
Qty: 60 TABLET | Refills: 0 | Status: SHIPPED | OUTPATIENT
Start: 2025-07-31

## 2025-07-31 RX ADMIN — IBUPROFEN 800 MG: 800 TABLET, FILM COATED ORAL at 06:17

## 2025-07-31 RX ADMIN — ACETAMINOPHEN 1000 MG: 500 TABLET ORAL at 06:17

## 2025-07-31 ASSESSMENT — PAIN SCALES - GENERAL: PAINLEVEL_OUTOF10: 3

## 2025-07-31 ASSESSMENT — PAIN - FUNCTIONAL ASSESSMENT: PAIN_FUNCTIONAL_ASSESSMENT: ACTIVITIES ARE NOT PREVENTED

## 2025-07-31 ASSESSMENT — PAIN DESCRIPTION - LOCATION: LOCATION: ABDOMEN

## 2025-07-31 ASSESSMENT — PAIN DESCRIPTION - DESCRIPTORS: DESCRIPTORS: CRAMPING

## 2025-07-31 NOTE — DISCHARGE SUMMARY
Obstetrical Discharge Summary     Name: Dorene Quick MRN: [id number]  SSN: [id number]    YOB: 2005  Age: 20 y.o.  Sex: female      Admit Date: 2025    Discharge Date: No discharge date for patient encounter.    Admitting Physician: Orville Briseno MD     Attending Physician:  Orville Briseno MD     Admission Diagnoses: 39 weeks gestation of pregnancy [Z3A.39]  Encounter for elective induction of labor [Z34.90]    Procedures for this admission:     Discharge Diagnoses: s/p   Information for the patient's :  Alex Quick [[id number]]   @[id number]@    baby girl apgar 8/9    Discharge Condition: good    Disposition:  home      Hospital Course: The patient was admitted for labor induction secondary to mild range blood pressures and oligohydramnios. Her blood pressures normalized during her labor and post partum course. On PPD 2 she denies vision changes, nausea, vomiting, ruq pain, epigastric pain, heavy vaginal bleeding, and pain wit urination.  Normal hospital course following the delivery.     Patient Instructions: Notify MD should she have persistent heavy vaginal bleeding with passage of clots, preeclamptic symptoms, or tenderness, redness, and edema of lower extremity.      Current Discharge Medication List        START taking these medications    Details   ibuprofen (ADVIL;MOTRIN) 800 MG tablet Take 1 tablet by mouth every 8 hours as needed for Pain  Qty: 60 tablet, Refills: 0           CONTINUE these medications which have NOT CHANGED    Details   Prenatal Vit-Fe Fumarate-FA (PRENATAL PO) Take by mouth      Probiotic Product (VH ESSENTIALS OPTIBALANCE) CAPS Take by mouth           STOP taking these medications       pseudoephedrine (SUDAFED SINUS CONGESTION 24HR) 240 MG TB24 extended release tablet Comments:   Reason for Stopping:         esomeprazole (NEXIUM) 40 MG delayed release capsule Comments:   Reason for Stopping:               Reference my discharge

## 2025-07-31 NOTE — LACTATION NOTE
This note was copied from a baby's chart.  1030 - Attempted to visit with mom; mom asleep at this time.    1220 - Checked in with mom to see how breastfeeding has been progressing and to offer lactation support as mom is being discharged today.  Mom denies any concerns/issues at this time, and reports baby is breastfeeding well.  Mom currently has baby latched in cradle hold on left breast; encouraged mom to \"sandwich\" breast with right hand to help with deeper latch and better milk transfer; mom able to demonstrate such.  Opportunity for questions provided.  Encouragement and praise given.  Aware of availability, with name and phone number written on whiteboard in room.  Advised to call for lactation assistance as needed.

## 2025-07-31 NOTE — PLAN OF CARE
Problem: Postpartum  Goal: Experiences normal postpartum course  Description:  Postpartum OB-Pregnancy care plan goal which identifies if the mother is experiencing a normal postpartum course  2025 by Estrada Floyd RN  Outcome: Progressing  2025 by Charla Otero RN  Outcome: Progressing     Problem: Postpartum  Goal: Appropriate maternal -  bonding  Description:  Postpartum OB-Pregnancy care plan goal which identifies if the mother and  are bonding appropriately  2025 by Estrada Floyd RN  Outcome: Progressing  2025 by Charla Otero RN  Outcome: Progressing     Problem: Postpartum  Goal: Establishment of infant feeding pattern  Description:  Postpartum OB-Pregnancy care plan goal which identifies if the mother is establishing a feeding pattern with their   2025 by Estrada Floyd RN  Outcome: Progressing  2025 by Charla Otero RN  Outcome: Progressing     Problem: Postpartum  Goal: Incisions, wounds, or drain sites healing without S/S of infection  2025 by Estrada Floyd RN  Outcome: Progressing  2025 by Charla Otero RN  Outcome: Progressing     Problem: Safety - Adult  Goal: Free from fall injury  2025 by Estrada Floyd RN  Outcome: Progressing  2025 by Charla Otero RN  Outcome: Progressing     
  Problem: Postpartum  Goal: Experiences normal postpartum course  Description:  Postpartum OB-Pregnancy care plan goal which identifies if the mother is experiencing a normal postpartum course  Outcome: Progressing     Problem: Postpartum  Goal: Appropriate maternal -  bonding  Description:  Postpartum OB-Pregnancy care plan goal which identifies if the mother and  are bonding appropriately  Outcome: Progressing     Problem: Postpartum  Goal: Establishment of infant feeding pattern  Description:  Postpartum OB-Pregnancy care plan goal which identifies if the mother is establishing a feeding pattern with their   Outcome: Progressing     Problem: Postpartum  Goal: Incisions, wounds, or drain sites healing without S/S of infection  Outcome: Progressing     Problem: Pain  Goal: Verbalizes/displays adequate comfort level or baseline comfort level  Outcome: Progressing  Flowsheets (Taken 2025 by Jaycob Arroyo RN)  Verbalizes/displays adequate comfort level or baseline comfort level:   Encourage patient to monitor pain and request assistance   Assess pain using appropriate pain scale   Administer analgesics based on type and severity of pain and evaluate response   Implement non-pharmacological measures as appropriate and evaluate response   Consider cultural and social influences on pain and pain management   Notify Licensed Independent Practitioner if interventions unsuccessful or patient reports new pain     Problem: Safety - Adult  Goal: Free from fall injury  Outcome: Progressing     
  Problem: Vaginal Birth or  Section  Goal: Fetal and maternal status remain reassuring during the birth process  Description:  Birth OB-Pregnancy care plan goal which identifies if the fetal and maternal status remain reassuring during the birth process  2025 by Ramon King RN  Outcome: Progressing  2025 by Charla Otero RN  Outcome: Progressing     Problem: Postpartum  Goal: Experiences normal postpartum course  Description:  Postpartum OB-Pregnancy care plan goal which identifies if the mother is experiencing a normal postpartum course  2025 by Ramon King RN  Outcome: Progressing  2025 by Charla Otero RN  Outcome: Progressing  Goal: Appropriate maternal -  bonding  Description:  Postpartum OB-Pregnancy care plan goal which identifies if the mother and  are bonding appropriately  2025 by Ramon King RN  Outcome: Progressing  2025 by Charla Otero RN  Outcome: Progressing  Goal: Establishment of infant feeding pattern  Description:  Postpartum OB-Pregnancy care plan goal which identifies if the mother is establishing a feeding pattern with their   2025 by Ramon King RN  Outcome: Progressing  2025 by Charla Otero RN  Outcome: Progressing  Goal: Incisions, wounds, or drain sites healing without S/S of infection  2025 by Ramon King RN  Outcome: Progressing  2025 by Charla Otero RN  Outcome: Progressing     Problem: Pain  Goal: Verbalizes/displays adequate comfort level or baseline comfort level  2025 by Ramon King RN  Outcome: Progressing  2025 by Charla Otero RN  Outcome: Progressing  Flowsheets  Taken 2025 0015  Verbalizes/displays adequate comfort level or baseline comfort level:   Encourage patient to monitor pain and request assistance   Assess pain using appropriate pain scale   Administer analgesics based on type and severity of pain and evaluate 
  Problem: Vaginal Birth or  Section  Goal: Fetal and maternal status remain reassuring during the birth process  Description:  Birth OB-Pregnancy care plan goal which identifies if the fetal and maternal status remain reassuring during the birth process  Outcome: Progressing     Problem: Postpartum  Goal: Experiences normal postpartum course  Description:  Postpartum OB-Pregnancy care plan goal which identifies if the mother is experiencing a normal postpartum course  Outcome: Progressing  Goal: Appropriate maternal -  bonding  Description:  Postpartum OB-Pregnancy care plan goal which identifies if the mother and  are bonding appropriately  Outcome: Progressing  Goal: Establishment of infant feeding pattern  Description:  Postpartum OB-Pregnancy care plan goal which identifies if the mother is establishing a feeding pattern with their   Outcome: Progressing  Goal: Incisions, wounds, or drain sites healing without S/S of infection  Outcome: Progressing     Problem: Pain  Goal: Verbalizes/displays adequate comfort level or baseline comfort level  Outcome: Progressing  Flowsheets (Taken 2025)  Verbalizes/displays adequate comfort level or baseline comfort level:   Encourage patient to monitor pain and request assistance   Assess pain using appropriate pain scale   Administer analgesics based on type and severity of pain and evaluate response   Implement non-pharmacological measures as appropriate and evaluate response   Consider cultural and social influences on pain and pain management   Notify Licensed Independent Practitioner if interventions unsuccessful or patient reports new pain     Problem: Infection - Adult  Goal: Absence of infection at discharge  Outcome: Progressing  Goal: Absence of infection during hospitalization  Outcome: Progressing     Problem: Safety - Adult  Goal: Free from fall injury  Outcome: Progressing     Problem: Discharge Planning  Goal: Discharge to 
non-pharmacological measures as appropriate and evaluate response   Consider cultural and social influences on pain and pain management   Notify Licensed Independent Practitioner if interventions unsuccessful or patient reports new pain     Problem: Infection - Adult  Goal: Absence of infection at discharge  Outcome: Progressing  Goal: Absence of infection during hospitalization  Outcome: Progressing     Problem: Safety - Adult  Goal: Free from fall injury  Outcome: Progressing     Problem: Discharge Planning  Goal: Discharge to home or other facility with appropriate resources  Outcome: Progressing     Problem: Chronic Conditions and Co-morbidities  Goal: Patient's chronic conditions and co-morbidity symptoms are monitored and maintained or improved  Outcome: Progressing

## 2025-08-01 ENCOUNTER — TELEPHONE (OUTPATIENT)
Facility: CLINIC | Age: 20
End: 2025-08-01

## 2025-08-09 ENCOUNTER — TELEMEDICINE ON DEMAND (OUTPATIENT)
Age: 20
End: 2025-08-09

## 2025-08-09 DIAGNOSIS — N64.89 OCCLUSION OF BREAST DUCT: Primary | ICD-10-CM

## 2025-08-09 DIAGNOSIS — O92.70 LACTATION PROBLEM: ICD-10-CM

## 2025-08-09 ASSESSMENT — ENCOUNTER SYMPTOMS
COLOR CHANGE: 0
SHORTNESS OF BREATH: 0
ROS SKIN COMMENTS: RIGHT BREAST PAIN
NAUSEA: 0
VOMITING: 0

## 2025-08-21 ENCOUNTER — TELEPHONE (OUTPATIENT)
Age: 20
End: 2025-08-21

## 2025-08-21 ENCOUNTER — OFFICE VISIT (OUTPATIENT)
Age: 20
End: 2025-08-21

## 2025-08-21 DIAGNOSIS — O91.23 MASTITIS ASSOCIATED WITH LACTATION: Primary | ICD-10-CM

## 2025-08-21 DIAGNOSIS — O91.23 MASTITIS ASSOCIATED WITH LACTATION: ICD-10-CM

## 2025-08-21 RX ORDER — FLUCONAZOLE 150 MG/1
150 TABLET ORAL ONCE
Qty: 1 TABLET | Refills: 0 | Status: SHIPPED | OUTPATIENT
Start: 2025-08-21 | End: 2025-08-21

## 2025-08-21 RX ORDER — FLUCONAZOLE 150 MG/1
150 TABLET ORAL ONCE
Qty: 1 TABLET | Refills: 0 | Status: SHIPPED | OUTPATIENT
Start: 2025-08-21 | End: 2025-08-21 | Stop reason: SDUPTHER

## 2025-08-21 RX ORDER — DICLOXACILLIN SODIUM 250 MG/1
250 CAPSULE ORAL 2 TIMES DAILY
Qty: 20 CAPSULE | Refills: 0 | Status: SHIPPED | OUTPATIENT
Start: 2025-08-21 | End: 2025-08-31

## 2025-08-21 RX ORDER — DICLOXACILLIN SODIUM 250 MG/1
250 CAPSULE ORAL 2 TIMES DAILY
Qty: 20 CAPSULE | Refills: 0 | Status: SHIPPED | OUTPATIENT
Start: 2025-08-21 | End: 2025-08-21 | Stop reason: SDUPTHER

## 2025-08-21 SDOH — ECONOMIC STABILITY: FOOD INSECURITY: WITHIN THE PAST 12 MONTHS, THE FOOD YOU BOUGHT JUST DIDN'T LAST AND YOU DIDN'T HAVE MONEY TO GET MORE.: NEVER TRUE

## 2025-08-21 SDOH — ECONOMIC STABILITY: FOOD INSECURITY: WITHIN THE PAST 12 MONTHS, YOU WORRIED THAT YOUR FOOD WOULD RUN OUT BEFORE YOU GOT MONEY TO BUY MORE.: NEVER TRUE

## 2025-08-21 ASSESSMENT — PATIENT HEALTH QUESTIONNAIRE - PHQ9
SUM OF ALL RESPONSES TO PHQ QUESTIONS 1-9: 0
2. FEELING DOWN, DEPRESSED OR HOPELESS: NOT AT ALL
1. LITTLE INTEREST OR PLEASURE IN DOING THINGS: NOT AT ALL

## 2025-08-25 ENCOUNTER — TELEPHONE (OUTPATIENT)
Age: 20
End: 2025-08-25